# Patient Record
Sex: MALE | Race: WHITE | URBAN - METROPOLITAN AREA
[De-identification: names, ages, dates, MRNs, and addresses within clinical notes are randomized per-mention and may not be internally consistent; named-entity substitution may affect disease eponyms.]

---

## 2022-07-07 ENCOUNTER — APPOINTMENT (RX ONLY)
Dept: URBAN - METROPOLITAN AREA CLINIC 16 | Facility: CLINIC | Age: 82
Setting detail: DERMATOLOGY
End: 2022-07-07

## 2022-07-07 DIAGNOSIS — D485 NEOPLASM OF UNCERTAIN BEHAVIOR OF SKIN: ICD-10-CM

## 2022-07-07 DIAGNOSIS — D18.0 HEMANGIOMA: ICD-10-CM

## 2022-07-07 DIAGNOSIS — D22 MELANOCYTIC NEVI: ICD-10-CM

## 2022-07-07 DIAGNOSIS — Z85.828 PERSONAL HISTORY OF OTHER MALIGNANT NEOPLASM OF SKIN: ICD-10-CM

## 2022-07-07 DIAGNOSIS — L81.4 OTHER MELANIN HYPERPIGMENTATION: ICD-10-CM

## 2022-07-07 DIAGNOSIS — L82.1 OTHER SEBORRHEIC KERATOSIS: ICD-10-CM

## 2022-07-07 PROBLEM — D18.01 HEMANGIOMA OF SKIN AND SUBCUTANEOUS TISSUE: Status: ACTIVE | Noted: 2022-07-07

## 2022-07-07 PROBLEM — D22.5 MELANOCYTIC NEVI OF TRUNK: Status: ACTIVE | Noted: 2022-07-07

## 2022-07-07 PROBLEM — D48.5 NEOPLASM OF UNCERTAIN BEHAVIOR OF SKIN: Status: ACTIVE | Noted: 2022-07-07

## 2022-07-07 PROCEDURE — ? DIAGNOSIS COMMENT

## 2022-07-07 PROCEDURE — ? COUNSELING

## 2022-07-07 PROCEDURE — 99203 OFFICE O/P NEW LOW 30 MIN: CPT | Mod: 25

## 2022-07-07 PROCEDURE — ? SUNSCREEN RECOMMENDATIONS

## 2022-07-07 PROCEDURE — 11102 TANGNTL BX SKIN SINGLE LES: CPT

## 2022-07-07 PROCEDURE — ? BIOPSY BY SHAVE METHOD

## 2022-07-07 ASSESSMENT — LOCATION DETAILED DESCRIPTION DERM
LOCATION DETAILED: LEFT MID-UPPER BACK
LOCATION DETAILED: LEFT RIB CAGE
LOCATION DETAILED: RIGHT INFERIOR LATERAL NECK
LOCATION DETAILED: RIGHT INFERIOR UPPER BACK
LOCATION DETAILED: LEFT ANTERIOR SHOULDER

## 2022-07-07 ASSESSMENT — LOCATION SIMPLE DESCRIPTION DERM
LOCATION SIMPLE: POSTERIOR NECK
LOCATION SIMPLE: RIGHT UPPER BACK
LOCATION SIMPLE: LEFT UPPER BACK
LOCATION SIMPLE: LEFT SHOULDER
LOCATION SIMPLE: ABDOMEN

## 2022-07-07 ASSESSMENT — LOCATION ZONE DERM
LOCATION ZONE: NECK
LOCATION ZONE: ARM
LOCATION ZONE: TRUNK

## 2022-07-07 NOTE — PROCEDURE: MIPS QUALITY
Quality 111:Pneumonia Vaccination Status For Older Adults: Pneumococcal vaccine administered on or after patient’s 60th birthday and before the end of the measurement period
Quality 47: Advance Care Plan: Advance Care Planning discussed and documented; advance care plan or surrogate decision maker documented in the medical record.
Quality 110: Preventive Care And Screening: Influenza Immunization: Influenza Immunization Administered during Influenza season
Quality 431: Preventive Care And Screening: Unhealthy Alcohol Use - Screening: Patient not identified as an unhealthy alcohol user when screened for unhealthy alcohol use using a systematic screening method
Quality 226: Preventive Care And Screening: Tobacco Use: Screening And Cessation Intervention: Patient screened for tobacco use and is an ex/non-smoker
Detail Level: Detailed

## 2022-07-07 NOTE — PROCEDURE: BIOPSY BY SHAVE METHOD
Detail Level: Detailed
Depth Of Biopsy: dermis
Was A Bandage Applied: Yes
Size Of Lesion In Cm: 1
X Size Of Lesion In Cm: 0
Biopsy Type: H and E
Biopsy Method: 15 blade
Anesthesia Type: 1% lidocaine with epinephrine
Anesthesia Volume In Cc (Will Not Render If 0): 0.5
Hemostasis: Drysol
Wound Care: Petrolatum
Dressing: bandage
Destruction After The Procedure: No
Type Of Destruction Used: Curettage
Curettage Text: The wound bed was treated with curettage after the biopsy was performed.
Cryotherapy Text: The wound bed was treated with cryotherapy after the biopsy was performed.
Electrodesiccation Text: The wound bed was treated with electrodesiccation after the biopsy was performed.
Electrodesiccation And Curettage Text: The wound bed was treated with electrodesiccation and curettage after the biopsy was performed.
Silver Nitrate Text: The wound bed was treated with silver nitrate after the biopsy was performed.
Lab: 6
Consent: Written consent was obtained and risks were reviewed including but not limited to scarring, infection, bleeding, scabbing, incomplete removal, nerve damage and allergy to anesthesia.
Post-Care Instructions: I reviewed with the patient in detail post-care instructions. Patient is to keep the biopsy site dry overnight, and then apply bacitracin twice daily until healed. Patient may apply hydrogen peroxide soaks to remove any crusting.
Notification Instructions: Patient will be notified of biopsy results. However, patient instructed to call the office if not contacted within 2 weeks.
Billing Type: Third-Party Bill
Information: Selecting Yes will display possible errors in your note based on the variables you have selected. This validation is only offered as a suggestion for you. PLEASE NOTE THAT THE VALIDATION TEXT WILL BE REMOVED WHEN YOU FINALIZE YOUR NOTE. IF YOU WANT TO FAX A PRELIMINARY NOTE YOU WILL NEED TO TOGGLE THIS TO 'NO' IF YOU DO NOT WANT IT IN YOUR FAXED NOTE.

## 2022-07-09 ENCOUNTER — TELEPHONE ENCOUNTER (OUTPATIENT)
Dept: URBAN - METROPOLITAN AREA CLINIC 121 | Facility: CLINIC | Age: 82
End: 2022-07-09

## 2022-07-10 ENCOUNTER — TELEPHONE ENCOUNTER (OUTPATIENT)
Dept: URBAN - METROPOLITAN AREA CLINIC 121 | Facility: CLINIC | Age: 82
End: 2022-07-10

## 2022-07-10 RX ORDER — METOPROLOL SUCCINATE 50 MG/1
TABLET, EXTENDED RELEASE ORAL
Refills: 0 | Status: ACTIVE | COMMUNITY
Start: 2010-12-03

## 2022-07-10 RX ORDER — NIACIN 1000 MG/1
TABLET, FILM COATED, EXTENDED RELEASE ORAL
Refills: 0 | Status: ACTIVE | COMMUNITY
Start: 2010-12-03

## 2022-07-10 RX ORDER — OMEGA-3S/DHA/EPA/FISH OIL 980-1400MG
CAPSULE,DELAYED RELEASE (ENTERIC COATED) ORAL
Refills: 0 | Status: ACTIVE | COMMUNITY
Start: 2010-12-03

## 2022-07-10 RX ORDER — ASPIRIN 81 MG/1
TABLET, COATED ORAL
Refills: 0 | Status: ACTIVE | COMMUNITY
Start: 2010-12-03

## 2022-08-11 ENCOUNTER — APPOINTMENT (RX ONLY)
Dept: URBAN - METROPOLITAN AREA CLINIC 16 | Facility: CLINIC | Age: 82
Setting detail: DERMATOLOGY
End: 2022-08-11

## 2022-08-11 PROBLEM — C44.41 BASAL CELL CARCINOMA OF SKIN OF SCALP AND NECK: Status: ACTIVE | Noted: 2022-08-11

## 2022-08-11 PROCEDURE — 12042 INTMD RPR N-HF/GENIT2.6-7.5: CPT

## 2022-08-11 PROCEDURE — ? COUNSELING

## 2022-08-11 PROCEDURE — 11623 EXC S/N/H/F/G MAL+MRG 2.1-3: CPT

## 2022-08-11 PROCEDURE — ? EXCISION

## 2023-06-21 ENCOUNTER — APPOINTMENT (OUTPATIENT)
Dept: URBAN - METROPOLITAN AREA CLINIC 195 | Age: 83
Setting detail: DERMATOLOGY
End: 2023-06-23

## 2023-06-21 VITALS
HEART RATE: 68 BPM | HEIGHT: 69 IN | RESPIRATION RATE: 16 BRPM | DIASTOLIC BLOOD PRESSURE: 62 MMHG | WEIGHT: 195 LBS | SYSTOLIC BLOOD PRESSURE: 124 MMHG | TEMPERATURE: 97.4 F

## 2023-06-21 DIAGNOSIS — L57.8 OTHER SKIN CHANGES DUE TO CHRONIC EXPOSURE TO NONIONIZING RADIATION: ICD-10-CM

## 2023-06-21 DIAGNOSIS — Z12.83 ENCOUNTER FOR SCREENING FOR MALIGNANT NEOPLASM OF SKIN: ICD-10-CM

## 2023-06-21 DIAGNOSIS — D18.0 HEMANGIOMA: ICD-10-CM

## 2023-06-21 DIAGNOSIS — L82.1 OTHER SEBORRHEIC KERATOSIS: ICD-10-CM

## 2023-06-21 PROBLEM — D18.01 HEMANGIOMA OF SKIN AND SUBCUTANEOUS TISSUE: Status: ACTIVE | Noted: 2023-06-21

## 2023-06-21 PROBLEM — C43.31 MALIGNANT MELANOMA OF NOSE: Status: ACTIVE | Noted: 2023-06-21

## 2023-06-21 PROBLEM — D03.61 MELANOMA IN SITU OF RIGHT UPPER LIMB, INCLUDING SHOULDER: Status: ACTIVE | Noted: 2023-06-21

## 2023-06-21 PROCEDURE — OTHER MOHS SURGERY: OTHER

## 2023-06-21 PROCEDURE — 17311 MOHS 1 STAGE H/N/HF/G: CPT

## 2023-06-21 PROCEDURE — OTHER ADDITIONAL PATHOLOGY SPECIMEN: OTHER

## 2023-06-21 PROCEDURE — 15260 FTH/GFT FR N/E/E/L 20 SQCM/<: CPT

## 2023-06-21 PROCEDURE — OTHER MIPS QUALITY: OTHER

## 2023-06-21 PROCEDURE — OTHER MELANOMA STAGING: OTHER

## 2023-06-21 PROCEDURE — OTHER SEPARATE AND IDENTIFIABLE DOCUMENTATION: OTHER

## 2023-06-21 PROCEDURE — OTHER COUNSELING: OTHER

## 2023-06-21 PROCEDURE — 99204 OFFICE O/P NEW MOD 45 MIN: CPT | Mod: 25

## 2023-06-21 PROCEDURE — OTHER DEFER: OTHER

## 2023-06-21 PROCEDURE — OTHER SURGICAL DECISION MAKING: OTHER

## 2023-06-21 PROCEDURE — OTHER ADDITIONAL NOTES: OTHER

## 2023-06-21 PROCEDURE — 88342 IMHCHEM/IMCYTCHM 1ST ANTB: CPT | Mod: 59

## 2023-06-21 ASSESSMENT — LOCATION ZONE DERM
LOCATION ZONE: ARM
LOCATION ZONE: TRUNK

## 2023-06-21 ASSESSMENT — LOCATION DETAILED DESCRIPTION DERM
LOCATION DETAILED: LEFT ANTERIOR PROXIMAL UPPER ARM
LOCATION DETAILED: XIPHOID
LOCATION DETAILED: LEFT MEDIAL UPPER BACK
LOCATION DETAILED: RIGHT ANTERIOR PROXIMAL UPPER ARM
LOCATION DETAILED: RIGHT MID-UPPER BACK
LOCATION DETAILED: LEFT MEDIAL SUPERIOR CHEST

## 2023-06-21 ASSESSMENT — LOCATION SIMPLE DESCRIPTION DERM
LOCATION SIMPLE: LEFT UPPER BACK
LOCATION SIMPLE: LEFT UPPER ARM
LOCATION SIMPLE: RIGHT UPPER ARM
LOCATION SIMPLE: RIGHT UPPER BACK
LOCATION SIMPLE: ABDOMEN
LOCATION SIMPLE: CHEST

## 2023-06-21 NOTE — PROCEDURE: ADDITIONAL NOTES
Render Risk Assessment In Note?: no
Patient Management Risk Assessment: Moderate
Additional Notes: Melanoma diagnosis and management has has inherent risk for additional primary melanoma, recurrence and/or metastasis.
Detail Level: Simple

## 2023-06-21 NOTE — PROCEDURE: MOHS SURGERY
Detail Level: Zone Initiate Treatment: Recommended to alternate Tylenol and ibuprofen every 4 hours. Not to exceed the maximum dose of her bottle. \\nContinue to apply an ice pack wrapped in a clean cloth to the area for up to 10min once every hour. 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

## 2023-06-21 NOTE — PROCEDURE: MOHS SURGERY
Need for prophylactic measure Hatchet Flap Text: Because of the full-thickness nature of the defect and to avoid deformity of free margin of the ala, and after full discussion of the spectrum of repair options, a dorsal nasal rotation flap was planned.  After prep and anesthesia, a Burow????????????????????????????????????s triangle was excised from the lateral portion superiorly on the nasal sidewall towards the inner canthus and an incision extended from the inferior margin of the wound across the nose and sidewall, then extended superiorly along the nasofacial sulcus and medial cheek through the inner canthus to the glabella where a back cut was made to the contralateral inner canthus.  The flap was elevated by skeletonizing the nasal root, dorsum, and sidewalls.  After hemostasis obtained, the flap was rotated/carried over into place, trimmed to fit the defect, and sutured in a layered fashion.

## 2023-06-21 NOTE — PROCEDURE: MIPS QUALITY
Quality 397: Melanoma: Reporting: Pathology report includes the pT Category, thickness, ulceration and mitotic rate, peripheral and deep margin status and presence or absence of microsatellitosis for invasive tumors.
Quality 138: Melanoma: Coordination Of Care: A treatment plan was communicated to the physicians providing continuing care within one month of diagnosis outlining: diagnosis, tumor thickness and a plan for surgery or alternate care.
Quality 137: Melanoma: Continuity Of Care - Recall System: Patient information entered into a recall system that includes: target date for the next exam specified AND a process to follow up with patients regarding missed or unscheduled appointments
Quality 130: Documentation Of Current Medications In The Medical Record: Current Medications Documented
Detail Level: Detailed
Quality 226: Preventive Care And Screening: Tobacco Use: Screening And Cessation Intervention: Patient screened for tobacco use and is an ex/non-smoker
Quality 47: Advance Care Plan: Advance Care Planning discussed and documented; advance care plan or surrogate decision maker documented in the medical record.

## 2023-06-21 NOTE — PROCEDURE: DEFER
Size Of Lesion In Cm (Optional): 0
Introduction Text (Please End With A Colon): The following procedure was deferred:
Detail Level: Detailed
Scheduling Instructions (Optional): Mohs surgery on 6/28/23

## 2023-06-21 NOTE — PROCEDURE: COUNSELING
Sunscreen Recommendations: Prescribed broad spectrum sunscreen spf 30+
Detail Level: Detailed
When Should The Patient Follow-Up For Their Next Full-Body Skin Exam?: 6 Months
Quality 138: Melanoma: Coordination Of Care: A treatment plan was communicated to the physicians providing continuing care within one month of diagnosis outlining: diagnosis, tumor thickness and a plan for surgery or alternate care.
Quality 137: Melanoma: Continuity Of Care - Recall System: Patient information entered into a recall system that includes: target date for the next exam specified AND a process to follow up with patients regarding missed or unscheduled appointments
Quality 397: Melanoma: Reporting: Pathology report includes the pT Category, thickness, ulceration and mitotic rate, peripheral and deep margin status and presence or absence of microsatellitosis for invasive tumors.
Show Follow-Up Variable: Yes
Detail Level: Generalized
Detail Level: Zone

## 2023-06-21 NOTE — PROCEDURE: MOHS SURGERY
Patient Education     Well-Child Checkup: 6 to 10 Years     Struggles in school can indicate problems with a child’s health or development. If your child is having trouble in school, talk to the child’s healthcare provider.     Even if your child is healthy, keep bringing him or her in for yearly checkups. These visits make sure that your child’s health is protected with scheduled vaccines and health screenings. Your child's healthcare provider will also check his or her growth and development. This sheet describes some of what you can expect.  School and social issues  Here are some topics you, your child, and the healthcare provider may want to discuss during this visit:  · Reading. Does your child like to read? Is the child reading at the right level for his or her age group?   · Friendships. Does your child have friends at school? How do they get along? Do you like your child’s friends? Do you have any concerns about your child’s friendships or problems that may be happening with other children (such as bullying)?  · Activities. What does your child like to do for fun? Is he or she involved in after-school activities such as sports, scouting, or music classes?   · Family interaction. How are things at home? Does your child have good relationships with others in the family? Does he or she talk to you about problems? How is the child’s behavior at home?   · Behavior and participation at school. How does your child act at school? Does the child follow the classroom routine and take part in group activities? What do teachers say about the child’s behavior? Is homework finished on time? Do you or other family members help with homework?  · Household chores. Does your child help around the house with chores such as taking out the trash or setting the table?  Nutrition and exercise tips  Teaching your child healthy eating and lifestyle habits can lead to a lifetime of good health. To help, set a good example with your  words and actions. Remember, good habits formed now will stay with your child forever. Here are some tips:  · Help your child get at least 30 to 60 minutes of active play per day. Moving around helps keep your child healthy. Go to the park, ride bikes, or play active games like tag or ball.  · Limit “screen time” to 1 hour each day. This includes time spent watching TV, playing video games, using the computer, and texting. If your child has a TV, computer, or video game console in the bedroom, replace it with a music player. For many kids, dancing and singing are fun ways to get moving.  · Limit sugary drinks. Soda, juice, and sports drinks lead to unhealthy weight gain and tooth decay. Water and low-fat or nonfat milk are best to drink. In moderation (6 ounces for a child 6 years old and 12 ounces for a child 7 to 10 years old daily), 100% fruit juice is OK. Save soda and other sugary drinks for special occasions.   · Serve nutritious foods. Keep a variety of healthy foods on hand for snacks, including fresh fruits and vegetables, lean meats, and whole grains. Foods like french fries, candy, and snack foods should only be served rarely.   · Serve child-sized portions. Children don’t need as much food as adults. Serve your child portions that make sense for his or her age and size. Let your child stop eating when he or she is full. If your child is still hungry after a meal, offer more vegetables or fruit.  · Ask the healthcare provider about your child’s weight. Your child should gain about 4 to 5 pounds each year. If your child is gaining more than that, talk to the healthcare provider about healthy eating habits and exercise guidelines.  · Bring your child to the dentist at least twice a year for teeth cleaning and a checkup.  Sleeping tips  Now that your child is in school, a good night’s sleep is even more important. At this age, your child needs about 10 hours of sleep each night. Here are some tips:  · Set a  bedtime and make sure your child follows it each night.  · TV, computer, and video games can agitate a child and make it hard to calm down for the night. Turn them off at least an hour before bed. Instead, read a chapter of a book together.  · Remind your child to brush and floss his or her teeth before bed. Directly supervise your child's dental self-care to make sure that both the back teeth and the front teeth are cleaned.  Safety tips  Recommendations to keep your child safe include the following:   · When riding a bike, your child should wear a helmet with the strap fastened. While roller-skating, roller-blading, or using a scooter or skateboard, it’s safest to wear wrist guards, elbow pads, and knee pads, as well as a helmet.  · In the car, continue to use a booster seat until your child is taller than 4 feet 9 inches. At this height, kids are able to sit with the seat belt fitting correctly over the collarbone and hips. Ask the healthcare provider if you have questions about when your child will be ready to stop using a booster seat. All children younger than 13 should sit in the back seat.  · Teach your child not to talk to strangers or go anywhere with a stranger.  · Teach your child to swim. Many communities offer low-cost swimming lessons. Do not let your child play in or around a pool unattended, even if he or she knows how to swim.  Vaccines  Based on recommendations from the CDC, at this visit your child may receive the following vaccines:  · Diphtheria, tetanus, and pertussis (age 6 only)  · Human papillomavirus (HPV) (ages 9 and up)  · Influenza (flu), annually  · Measles, mumps, and rubella (age 6)  · Polio (age 6)  · Varicella (chickenpox) (age 6)  Bedwetting: It’s not your child’s fault  Bedwetting, or urinating when sleeping, can be frustrating for both you and your child. But it’s usually not a sign of a major problem. Your child’s body may simply need more time to mature. If a child suddenly  starts wetting the bed, the cause is often a lifestyle change (such as starting school) or a stressful event (such as the birth of a sibling). But whatever the cause, it’s not in your child’s direct control. If your child wets the bed:  · Keep in mind that your child is not wetting on purpose. Never punish or tease a child for wetting the bed. Punishment or shaming may make the problem worse, not better.  · To help your child, be positive and supportive. Praise your child for not wetting and even for trying hard to stay dry.  · Two hours before bedtime, don’t serve your child anything to drink.  · Remind your child to use the toilet before bed. You could also wake him or her to use the bathroom before you go to bed yourself.  · Have a routine for changing sheets and pajamas when the child wets. Try to make this routine as calm and orderly as possible. This will help keep both you and your child from getting too upset or frustrated to go back to sleep.  · Put up a calendar or chart and give your child a star or sticker for nights that he or she doesn’t wet the bed.  · Encourage your child to get out of bed and try to use the toilet if he or she wakes during the night. Put night-lights in the bedroom, hallway, and bathroom to help your child feel safer walking to the bathroom.  · If you have concerns about bedwetting, discuss them with the healthcare provider.       Next checkup at: _______________________________     PARENT NOTES:  Date Last Reviewed: 12/1/2016  © 9345-7375 [employer]. [address] [place] [address], [place] [address]. All rights reserved. This information is not intended as a substitute for professional medical care. Always follow your healthcare professional's instructions.            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

## 2023-06-21 NOTE — PROCEDURE: SURGICAL DECISION MAKING
Identified Risk Factors Documented?: yes
Discussion: Decision for surgery refers to any surgeries performed today, or discussion of treatment in the future. In general, decision for repair is not made until the final extent of the surgical wound is known.
Complexity (Necessary For Coding; Major - 90 Day Global With Some Exceptions; Minor - 10 Day Global): minor
Date Of Surgery - Today Or Tomorrow?: today
Risk Assessment Explanation (Does Not Render In The Note): Clinical determination of the probability and/or consequences of an event, such as surgery. Clinical assessment of the level of risk is affected by the nature of the event under consideration for the patient. Modifier 57 is used to indicate an Evaluation and Management (E/M) service resulted in the initial decision to perform surgery either the day before a major surgery (90 day global) or the day of a major surgery.
Hypokalemia

## 2023-06-21 NOTE — PROCEDURE: MELANOMA STAGING
Detail Level: Detailed
Counseling For Stage Ib Melanomas: I reviewed the factors which determine melanoma stage. For Stage IB the 5-year survival rate is around 92%. The 10-year survival is around 86%. I recommended at least monthly skin self-examinations and close dermatology follow-up.
Counseling For Stage Iia Melanomas: I reviewed the factors which determine melanoma stage. For Stage IIA the 5-year survival rate is around 81%. The 10-year survival is around 67%. I recommended at least monthly skin self-examinations and close dermatology follow-up.
Counseling For Stage Iib Melanomas: I reviewed the factors which determine melanoma stage. For Stage IIB the 5-year survival rate is around 70%. The 10-year survival is around 57%. I recommended at least monthly skin self-examinations and close dermatology follow-up.
Counseling For Stage 0 Melanomas: I reviewed the factors which determine melanoma stage. For Stage 0 the 5-year survival rate is 100%. I recommended at least monthly skin self-examinations and close dermatology follow-up.
Distant Metastases: No distant metastases
Who Was Counseled?: patient and their spouse
Counseling For Stage Ia Melanomas: I reviewed the factors which determine melanoma stage. For Stage IA the 5-year survival rate is around 97%. The 10-year survival is around 95%. I recommended at least monthly skin self-examinations and close dermatology follow-up.
Counseling For Stage Iiia Melanomas: I reviewed the factors which determine melanoma stage. For Stage IIIA the 5-year survival rate is around 78%. The 10-year survival is around 68%. I recommended at least monthly skin self-examinations and close dermatology follow-up.
Counseling For Stage Iiib Melanomas: I reviewed the factors which determine melanoma stage. For Stage IIIB the 5-year survival rate is around 59%. The 10-year survival is around 43%. I recommended at least monthly skin self-examinations and close dermatology follow-up.
Counseling For Stage Iiic Melanomas: I reviewed the factors which determine melanoma stage. For Stage IIIC the 5-year survival rate is around 40%. The 10-year survival is around 24%. I recommended at least monthly skin self-examinations and close dermatology follow-up.
Counseling For Stage Iiid Melanomas: I reviewed the factors which determine melanoma stage. For Stage IIID the 5-year survival rate is around 32%. The 10-year survival is around 24%. I recommended at least monthly skin self-examinations and close dermatology follow-up.
Counseling For Stage Iv Melanomas: I reviewed the factors which determine melanoma stage. For Stage IV the 5-year survival rate is around 15% to 20%. The 10-year survival is about 10% to 15%. I recommended at least monthly skin self-examinations and close dermatology follow-up.
Staging Type: initial staging
Primary Tumor Identified?: Yes
Counseling For Stage Iic Melanomas: I reviewed the factors which determine melanoma stage. For Stage IIC the 5-year survival rate is around 53%. The 10-year survival is around 40%. I recommended at least monthly skin self-examinations and close dermatology follow-up.
Breslow Depth In Mm (Leave At 0 For In Situ): 0.3
Ulceration: No
Lymph Node Evaluation (Clinical And Microscopic): No clinically palpable lymph nodes and no microscopic lymph node metastases

## 2023-06-28 ENCOUNTER — APPOINTMENT (OUTPATIENT)
Dept: URBAN - METROPOLITAN AREA CLINIC 195 | Age: 83
Setting detail: DERMATOLOGY
End: 2023-06-30

## 2023-06-28 VITALS
HEIGHT: 69 IN | HEART RATE: 68 BPM | RESPIRATION RATE: 16 BRPM | DIASTOLIC BLOOD PRESSURE: 64 MMHG | WEIGHT: 195 LBS | SYSTOLIC BLOOD PRESSURE: 124 MMHG | TEMPERATURE: 97.8 F

## 2023-06-28 DIAGNOSIS — Z85.820 PERSONAL HISTORY OF MALIGNANT MELANOMA OF SKIN: ICD-10-CM

## 2023-06-28 PROBLEM — Z48.01 ENCOUNTER FOR CHANGE OR REMOVAL OF SURGICAL WOUND DRESSING: Status: ACTIVE | Noted: 2023-06-28

## 2023-06-28 PROBLEM — D03.61 MELANOMA IN SITU OF RIGHT UPPER LIMB, INCLUDING SHOULDER: Status: ACTIVE | Noted: 2023-06-28

## 2023-06-28 PROCEDURE — OTHER SEPARATE AND IDENTIFIABLE DOCUMENTATION: OTHER

## 2023-06-28 PROCEDURE — OTHER MIPS QUALITY: OTHER

## 2023-06-28 PROCEDURE — OTHER MOHS SURGERY: OTHER

## 2023-06-28 PROCEDURE — OTHER ADDITIONAL NOTES: OTHER

## 2023-06-28 PROCEDURE — 13121 CMPLX RPR S/A/L 2.6-7.5 CM: CPT

## 2023-06-28 PROCEDURE — OTHER DRESSING CHANGE (GLOBAL PERIOD): OTHER

## 2023-06-28 PROCEDURE — OTHER SURGICAL DECISION MAKING: OTHER

## 2023-06-28 PROCEDURE — 88342 IMHCHEM/IMCYTCHM 1ST ANTB: CPT | Mod: 59

## 2023-06-28 PROCEDURE — 99024 POSTOP FOLLOW-UP VISIT: CPT

## 2023-06-28 PROCEDURE — 17313 MOHS 1 STAGE T/A/L: CPT | Mod: 79

## 2023-06-28 PROCEDURE — OTHER MELANOMA STAGING: OTHER

## 2023-06-28 PROCEDURE — OTHER COUNSELING: OTHER

## 2023-06-28 NOTE — PROCEDURE: DRESSING CHANGE (GLOBAL PERIOD)
Add 76615 Cpt? (Important Note: In 2017 The Use Of 53861 Is Being Tracked By Cms To Determine Future Global Period Reimbursement For Global Periods): yes
Detail Level: Detailed

## 2023-06-28 NOTE — PROCEDURE: MOHS SURGERY
Mustarde Flap Text: Because of full-thickness of the defect and to avoid distortion of the lower eyelid and canthus, a Mustarde cheek rotation flap was planned. After prep and anesthesia, a large Burow's triangle was excised inferiorly.  An incision was made from the superior portion of the wound over the orbital rim, curving upward onto the temple, then down toward the preauricular crease where another Burow????????????????????????????????????????????????????????????????????????????????????????????????????????????????????????????????????????????????????????????????????????????????????????????????????????????????????????????????????????????????????????????????????????????????????????????????????????????????????????????????????????????????????????????????????????????????????????????????????????????????????????????????????????????????s triangle was excised.  The full cheek flap was elevated and the wound edges were undermined in the subcutaneous plane. After hemostasis, the flap was carried over and rotated into place with care to preserve lower lid position, trimmed at the tip, suspended with a periosteal stitch from the orbital rim, and sutured in a a layered fashion.

## 2023-06-28 NOTE — PROCEDURE: MOHS SURGERY
40 No Repair - Repaired With Adjacent Surgical Defect Text (Leave Blank If You Do Not Want): After obtaining clear surgical margins the defect was repaired concurrently with another surgical defect which was in close approximation.

## 2023-06-28 NOTE — PROCEDURE: MELANOMA STAGING
Counseling For Stage Iia Melanomas: I reviewed the factors which determine melanoma stage. For Stage IIA the 5-year survival rate is around 81%. The 10-year survival is around 67%. I recommended at least monthly skin self-examinations and close dermatology follow-up.
Counseling For Stage Iic Melanomas: I reviewed the factors which determine melanoma stage. For Stage IIC the 5-year survival rate is around 53%. The 10-year survival is around 40%. I recommended at least monthly skin self-examinations and close dermatology follow-up.
Counseling For Stage Iiib Melanomas: I reviewed the factors which determine melanoma stage. For Stage IIIB the 5-year survival rate is around 59%. The 10-year survival is around 43%. I recommended at least monthly skin self-examinations and close dermatology follow-up.
Counseling For Stage Iiid Melanomas: I reviewed the factors which determine melanoma stage. For Stage IIID the 5-year survival rate is around 32%. The 10-year survival is around 24%. I recommended at least monthly skin self-examinations and close dermatology follow-up.
Distant Metastases: No distant metastases
Counseling For Stage 0 Melanomas: I reviewed the factors which determine melanoma stage. For Stage 0 the 5-year survival rate is 100%. I recommended at least monthly skin self-examinations and close dermatology follow-up.
Ulceration: No
Counseling For Stage Ib Melanomas: I reviewed the factors which determine melanoma stage. For Stage IB the 5-year survival rate is around 92%. The 10-year survival is around 86%. I recommended at least monthly skin self-examinations and close dermatology follow-up.
Counseling For Stage Iib Melanomas: I reviewed the factors which determine melanoma stage. For Stage IIB the 5-year survival rate is around 70%. The 10-year survival is around 57%. I recommended at least monthly skin self-examinations and close dermatology follow-up.
Mitotic Rate: Less than 1/mm2
Detail Level: Detailed
Who Was Counseled?: patient
Staging Type: initial staging
Breslow Depth In Mm (Leave At 0 For In Situ): 0
Counseling For Stage Ia Melanomas: I reviewed the factors which determine melanoma stage. For Stage IA the 5-year survival rate is around 97%. The 10-year survival is around 95%. I recommended at least monthly skin self-examinations and close dermatology follow-up.
Are Regional Lymph Nodes Available For Evaluation: Yes
Lymph Node Evaluation (Clinical And Microscopic): No clinically palpable lymph nodes and no microscopic lymph node metastases
Counseling For Stage Iiic Melanomas: I reviewed the factors which determine melanoma stage. For Stage IIIC the 5-year survival rate is around 40%. The 10-year survival is around 24%. I recommended at least monthly skin self-examinations and close dermatology follow-up.
Counseling For Stage Iiia Melanomas: I reviewed the factors which determine melanoma stage. For Stage IIIA the 5-year survival rate is around 78%. The 10-year survival is around 68%. I recommended at least monthly skin self-examinations and close dermatology follow-up.
Counseling For Stage Iv Melanomas: I reviewed the factors which determine melanoma stage. For Stage IV the 5-year survival rate is around 15% to 20%. The 10-year survival is about 10% to 15%. I recommended at least monthly skin self-examinations and close dermatology follow-up.

## 2023-06-28 NOTE — PROCEDURE: SURGICAL DECISION MAKING
Discussion: Decision for surgery refers to any surgeries performed today, or discussion of treatment in the future. In general, decision for repair is not made until the final extent of the surgical wound is known.
Complexity (Necessary For Coding; Major - 90 Day Global With Some Exceptions; Minor - 10 Day Global): major
Identified Risk Factors Documented?: yes
Date Of Surgery - Today Or Tomorrow?: today
Risk Assessment Explanation (Does Not Render In The Note): Clinical determination of the probability and/or consequences of an event, such as surgery. Clinical assessment of the level of risk is affected by the nature of the event under consideration for the patient. Modifier 57 is used to indicate an Evaluation and Management (E/M) service resulted in the initial decision to perform surgery either the day before a major surgery (90 day global) or the day of a major surgery.
Initial Decision For Surgery?: No

## 2023-06-28 NOTE — PROCEDURE: ADDITIONAL NOTES
Render Risk Assessment In Note?: no
Detail Level: Simple
Additional Notes: Debulking results discussed with patient.

## 2023-06-28 NOTE — PROCEDURE: MOHS SURGERY
Statement Selected Consent (Ear)/Introductory Paragraph: The rationale for Mohs was explained to the patient and consent was obtained. The risks, benefits and alternatives to therapy were discussed in detail. Specifically, the risks of ear deformity, infection, scarring, bleeding, prolonged wound healing, incomplete removal, allergy to anesthesia, nerve injury and recurrence were addressed. Prior to the procedure, the treatment site was clearly identified and confirmed by the patient. All components of Universal Protocol/PAUSE Rule completed.

## 2023-06-28 NOTE — PROCEDURE: MIPS QUALITY
Quality 47: Advance Care Plan: Advance Care Planning discussed and documented; advance care plan or surrogate decision maker documented in the medical record.
Quality 226: Preventive Care And Screening: Tobacco Use: Screening And Cessation Intervention: Patient screened for tobacco use and is an ex/non-smoker
Quality 397: Melanoma: Reporting: Pathology report includes the pT Category, thickness, ulceration and mitotic rate, peripheral and deep margin status and presence or absence of microsatellitosis for invasive tumors.
Detail Level: Detailed
Quality 130: Documentation Of Current Medications In The Medical Record: Current Medications Documented
Quality 137: Melanoma: Continuity Of Care - Recall System: Patient information entered into a recall system that includes: target date for the next exam specified AND a process to follow up with patients regarding missed or unscheduled appointments
Quality 138: Melanoma: Coordination Of Care: A treatment plan was communicated to the physicians providing continuing care within one month of diagnosis outlining: diagnosis, tumor thickness and a plan for surgery or alternate care.

## 2023-07-07 NOTE — PROCEDURE: MOHS SURGERY
Medical Necessity Information: It is in your best interest to select a reason for this procedure from the list below. All of these items fulfill various CMS LCD requirements except lesion extends to a margin. Include Z78.9 (Other Specified Conditions Influencing Health Status) As An Associated Diagnosis?: No Medical Necessity Clause: This procedure was medically necessary because the lesion that was treated was: Lab: [id number] Lab Facility:  Surgeon (Optional): Favio Harmon M.D. Biopsy Photograph Reviewed: Yes Size Of Lesion In Cm: 1.1 Size Of Margin In Cm: 0.2 Anesthesia Volume In Cc: 2 Eye Clamp Note Details: An eye clamp was used during the procedure. Excision Method: Excisional Biopsy Saucerization Depth: dermis and superficial adipose tissue Repair Type: Intermediate Intermediate / Complex Repair - Final Wound Length In Cm: 2.7 Suturegard Retention Suture: 2-0 Nylon Retention Suture Bite Size: 3 mm Length To Time In Minutes Device Was In Place: 10 Number Of Hemigard Strips Per Side: 1 Undermining Type: Entire Wound Debridement Text: The wound edges were debrided prior to proceeding with the closure to facilitate wound healing. Helical Rim Text: The closure involved the helical rim. Vermilion Border Text: The closure involved the vermilion border. Nostril Rim Text: The closure involved the nostril rim. Retention Suture Text: Retention sutures were placed to support the closure and prevent dehiscence. Primary Defect Length (In Cm): 0 Suture Removal: 7 days Epidermal Closure Graft Donor Site (Optional): simple interrupted Graft Donor Site Bandage (Optional-Leave Blank If You Don't Want In Note): Steri-strips and a pressure bandage were applied to the donor site. Detail Level: Detailed Excision Depth: adipose tissue Scalpel Size: 15 blade Anesthesia Type: 1% lidocaine with 1:100,000 epinephrine and a 1:12 solution of 8.4% sodium bicarbonate Additional Anesthesia Volume In Cc: 6 Hemostasis: Electrodesiccation Estimated Blood Loss (Cc): minimal Undermining Location (Optional): in the deep fat Deep Sutures: 4-0 Maxon Dermal Closure: buried vertical mattress Epidermal Sutures: 6-0 Surgipro Epidermal Closure: running cuticular Wound Care: Petrolatum Dressing: pressure dressing with telfa Suturegard Intro: Intraoperative tissue expansion was performed, utilizing the SUTUREGARD device, in order to reduce wound tension. Suturegard Body: The suture ends were repeatedly re-tightened and re-clamped to achieve the desired tissue expansion. Hemigard Intro: Due to skin fragility and wound tension, it was decided to use HEMIGARD adhesive retention suture devices to permit a linear closure. The skin was cleaned and dried for a 6cm distance away from the wound. Excessive hair, if present, was removed to allow for adhesion. Hemigard Postcare Instructions: The HEMIGARD strips are to remain completely dry for at least 5-7 days. Positioning (Leave Blank If You Do Not Want): The patient was placed in a comfortable position exposing the surgical site. Complex Repair Preamble Text (Leave Blank If You Do Not Want): Extensive wide undermining was performed. Intermediate Repair Preamble Text (Leave Blank If You Do Not Want): Undermining was performed with blunt dissection. Fusiform Excision Additional Text (Leave Blank If You Do Not Want): The margin was drawn around the clinically apparent lesion.  A fusiform shape was then drawn on the skin incorporating the lesion and margins.  Incisions were then made along these lines to the appropriate tissue plane and the lesion was extirpated. Eliptical Excision Additional Text (Leave Blank If You Do Not Want): The margin was drawn around the clinically apparent lesion.  An elliptical shape was then drawn on the skin incorporating the lesion and margins.  Incisions were then made along these lines to the appropriate tissue plane and the lesion was extirpated. Saucerization Excision Additional Text (Leave Blank If You Do Not Want): The margin was drawn around the clinically apparent lesion.  Incisions were then made along these lines, in a tangential fashion, to the appropriate tissue plane and the lesion was extirpated. Slit Excision Additional Text (Leave Blank If You Do Not Want): A linear line was drawn on the skin overlying the lesion. An incision was made slowly until the lesion was visualized.  Once visualized, the lesion was removed with blunt dissection. Excisional Biopsy Additional Text (Leave Blank If You Do Not Want): The margin was drawn around the clinically apparent lesion. An elliptical shape was then drawn on the skin incorporating the lesion and margins.  Incisions were then made along these lines to the appropriate tissue plane and the lesion was extirpated. Perilesional Excision Additional Text (Leave Blank If You Do Not Want): The margin was drawn around the clinically apparent lesion. Incisions were then made along these lines to the appropriate tissue plane and the lesion was extirpated. Repair Performed By Another Provider Text (Leave Blank If You Do Not Want): After the tissue was excised the defect was repaired by another provider. No Repair - Repaired With Adjacent Surgical Defect Text (Leave Blank If You Do Not Want): After the excision the defect was repaired concurrently with another surgical defect which was in close approximation. Adjacent Tissue Transfer Text: The defect edges were debeveled with a #15 scalpel blade.  Given the location of the defect and the proximity to free margins an adjacent tissue transfer was deemed most appropriate.  Using a sterile surgical marker, an appropriate flap was drawn incorporating the defect and placing the expected incisions within the relaxed skin tension lines where possible.    The area thus outlined was incised deep to adipose tissue with a #15 scalpel blade.  The skin margins were undermined to an appropriate distance in all directions utilizing iris scissors. Advancement Flap (Single) Text: The defect edges were debeveled with a #15 scalpel blade.  Given the location of the defect and the proximity to free margins a single advancement flap was deemed most appropriate.  Using a sterile surgical marker, an appropriate advancement flap was drawn incorporating the defect and placing the expected incisions within the relaxed skin tension lines where possible.    The area thus outlined was incised deep to adipose tissue with a #15 scalpel blade.  The skin margins were undermined to an appropriate distance in all directions utilizing iris scissors. Advancement Flap (Double) Text: The defect edges were debeveled with a #15 scalpel blade.  Given the location of the defect and the proximity to free margins a double advancement flap was deemed most appropriate.  Using a sterile surgical marker, the appropriate advancement flaps were drawn incorporating the defect and placing the expected incisions within the relaxed skin tension lines where possible.    The area thus outlined was incised deep to adipose tissue with a #15 scalpel blade.  The skin margins were undermined to an appropriate distance in all directions utilizing iris scissors. Burow's Advancement Flap Text: The defect edges were debeveled with a #15 scalpel blade.  Given the location of the defect and the proximity to free margins a Burow's advancement flap was deemed most appropriate.  Using a sterile surgical marker, the appropriate advancement flap was drawn incorporating the defect and placing the expected incisions within the relaxed skin tension lines where possible.    The area thus outlined was incised deep to adipose tissue with a #15 scalpel blade.  The skin margins were undermined to an appropriate distance in all directions utilizing iris scissors. Chonodrocutaneous Helical Advancement Flap Text: The defect edges were debeveled with a #15 scalpel blade.  Given the location of the defect and the proximity to free margins a chondrocutaneous helical advancement flap was deemed most appropriate.  Using a sterile surgical marker, the appropriate advancement flap was drawn incorporating the defect and placing the expected incisions within the relaxed skin tension lines where possible.    The area thus outlined was incised deep to adipose tissue with a #15 scalpel blade.  The skin margins were undermined to an appropriate distance in all directions utilizing iris scissors. Crescentic Advancement Flap Text: The defect edges were debeveled with a #15 scalpel blade.  Given the location of the defect and the proximity to free margins a crescentic advancement flap was deemed most appropriate.  Using a sterile surgical marker, the appropriate advancement flap was drawn incorporating the defect and placing the expected incisions within the relaxed skin tension lines where possible.    The area thus outlined was incised deep to adipose tissue with a #15 scalpel blade.  The skin margins were undermined to an appropriate distance in all directions utilizing iris scissors. A-T Advancement Flap Text: The defect edges were debeveled with a #15 scalpel blade.  Given the location of the defect, shape of the defect and the proximity to free margins an A-T advancement flap was deemed most appropriate.  Using a sterile surgical marker, an appropriate advancement flap was drawn incorporating the defect and placing the expected incisions within the relaxed skin tension lines where possible.    The area thus outlined was incised deep to adipose tissue with a #15 scalpel blade.  The skin margins were undermined to an appropriate distance in all directions utilizing iris scissors. O-T Advancement Flap Text: The defect edges were debeveled with a #15 scalpel blade.  Given the location of the defect, shape of the defect and the proximity to free margins an O-T advancement flap was deemed most appropriate.  Using a sterile surgical marker, an appropriate advancement flap was drawn incorporating the defect and placing the expected incisions within the relaxed skin tension lines where possible.    The area thus outlined was incised deep to adipose tissue with a #15 scalpel blade.  The skin margins were undermined to an appropriate distance in all directions utilizing iris scissors. O-L Flap Text: The defect edges were debeveled with a #15 scalpel blade.  Given the location of the defect, shape of the defect and the proximity to free margins an O-L flap was deemed most appropriate.  Using a sterile surgical marker, an appropriate advancement flap was drawn incorporating the defect and placing the expected incisions within the relaxed skin tension lines where possible.    The area thus outlined was incised deep to adipose tissue with a #15 scalpel blade.  The skin margins were undermined to an appropriate distance in all directions utilizing iris scissors. O-Z Flap Text: The defect edges were debeveled with a #15 scalpel blade.  Given the location of the defect, shape of the defect and the proximity to free margins an O-Z flap was deemed most appropriate.  Using a sterile surgical marker, an appropriate transposition flap was drawn incorporating the defect and placing the expected incisions within the relaxed skin tension lines where possible. The area thus outlined was incised deep to adipose tissue with a #15 scalpel blade.  The skin margins were undermined to an appropriate distance in all directions utilizing iris scissors. V-Y Flap Text: Because of the full-thickness nature of the wound and to preserve nearby structures, a V-toY Advancement flap was planned. After prep and local anesthesia, a Burow's triangle was excised adjacent to the defect.  Opposite from this, two smaller Burow's triangles were excised.  Three flaps were created by undermining in the deep subcutaneous plane. After hemostasis, the flaps were advanced, carried over  and closed in a layered fashion. Double O-Z Flap Text: The defect edges were debeveled with a #15 scalpel blade.  Given the location of the defect, shape of the defect and the proximity to free margins a Double O-Z flap was deemed most appropriate.  Using a sterile surgical marker, an appropriate transposition flap was drawn incorporating the defect and placing the expected incisions within the relaxed skin tension lines where possible. The area thus outlined was incised deep to adipose tissue with a #15 scalpel blade.  The skin margins were undermined to an appropriate distance in all directions utilizing iris scissors. V-Y Flap Text: The defect edges were debeveled with a #15 scalpel blade.  Given the location of the defect, shape of the defect and the proximity to free margins a V-Y flap was deemed most appropriate.  Using a sterile surgical marker, an appropriate advancement flap was drawn incorporating the defect and placing the expected incisions within the relaxed skin tension lines where possible.    The area thus outlined was incised deep to adipose tissue with a #15 scalpel blade.  The skin margins were undermined to an appropriate distance in all directions utilizing iris scissors. Advancement-Rotation Flap Text: The defect edges were debeveled with a #15 scalpel blade.  Given the location of the defect, shape of the defect and the proximity to free margins an advancement-rotation flap was deemed most appropriate.  Using a sterile surgical marker, an appropriate flap was drawn incorporating the defect and placing the expected incisions within the relaxed skin tension lines where possible. The area thus outlined was incised deep to adipose tissue with a #15 scalpel blade.  The skin margins were undermined to an appropriate distance in all directions utilizing iris scissors. Mercedes Flap Text: The defect edges were debeveled with a #15 scalpel blade.  Given the location of the defect, shape of the defect and the proximity to free margins a Mercedes flap was deemed most appropriate.  Using a sterile surgical marker, an appropriate advancement flap was drawn incorporating the defect and placing the expected incisions within the relaxed skin tension lines where possible. The area thus outlined was incised deep to adipose tissue with a #15 scalpel blade.  The skin margins were undermined to an appropriate distance in all directions utilizing iris scissors. Modified Advancement Flap Text: The defect edges were debeveled with a #15 scalpel blade.  Given the location of the defect, shape of the defect and the proximity to free margins a modified advancement flap was deemed most appropriate.  Using a sterile surgical marker, an appropriate advancement flap was drawn incorporating the defect and placing the expected incisions within the relaxed skin tension lines where possible.    The area thus outlined was incised deep to adipose tissue with a #15 scalpel blade.  The skin margins were undermined to an appropriate distance in all directions utilizing iris scissors. Mucosal Advancement Flap Text: Given the location of the defect, shape of the defect and the proximity to free margins a mucosal advancement flap was deemed most appropriate. Incisions were made with a 15 blade scalpel in the appropriate fashion along the cutaneous vermillion border and the mucosal lip. The remaining actinically damaged mucosal tissue was excised.  The mucosal advancement flap was then elevated to the gingival sulcus with care taken to preserve the neurovascular structures and advanced into the primary defect. Care was taken to ensure that precise realignment of the vermilion border was achieved. Peng Advancement Flap Text: The defect edges were debeveled with a #15 scalpel blade.  Given the location of the defect, shape of the defect and the proximity to free margins a Peng advancement flap was deemed most appropriate.  Using a sterile surgical marker, an appropriate advancement flap was drawn incorporating the defect and placing the expected incisions within the relaxed skin tension lines where possible. The area thus outlined was incised deep to adipose tissue with a #15 scalpel blade.  The skin margins were undermined to an appropriate distance in all directions utilizing iris scissors. Hatchet Flap Text: The defect edges were debeveled with a #15 scalpel blade.  Given the location of the defect, shape of the defect and the proximity to free margins a hatchet flap was deemed most appropriate.  Using a sterile surgical marker, an appropriate hatchet flap was drawn incorporating the defect and placing the expected incisions within the relaxed skin tension lines where possible.    The area thus outlined was incised deep to adipose tissue with a #15 scalpel blade.  The skin margins were undermined to an appropriate distance in all directions utilizing iris scissors. Rotation Flap Text: The defect edges were debeveled with a #15 scalpel blade.  Given the location of the defect, shape of the defect and the proximity to free margins a rotation flap was deemed most appropriate.  Using a sterile surgical marker, an appropriate rotation flap was drawn incorporating the defect and placing the expected incisions within the relaxed skin tension lines where possible.    The area thus outlined was incised deep to adipose tissue with a #15 scalpel blade.  The skin margins were undermined to an appropriate distance in all directions utilizing iris scissors. Bilateral Rotation Flap Text: The defect edges were debeveled with a #15 scalpel blade. Given the location of the defect, shape of the defect and the proximity to free margins a bilateral rotation flap was deemed most appropriate. Using a sterile surgical marker, an appropriate rotation flap was drawn incorporating the defect and placing the expected incisions within the relaxed skin tension lines where possible. The area thus outlined was incised deep to adipose tissue with a #15 scalpel blade. The skin margins were undermined to an appropriate distance in all directions utilizing iris scissors. Following this, the designed flap was carried over into the primary defect and sutured into place. Spiral Flap Text: The defect edges were debeveled with a #15 scalpel blade.  Given the location of the defect, shape of the defect and the proximity to free margins a spiral flap was deemed most appropriate.  Using a sterile surgical marker, an appropriate rotation flap was drawn incorporating the defect and placing the expected incisions within the relaxed skin tension lines where possible. The area thus outlined was incised deep to adipose tissue with a #15 scalpel blade.  The skin margins were undermined to an appropriate distance in all directions utilizing iris scissors. Staged Advancement Flap Text: The defect edges were debeveled with a #15 scalpel blade.  Given the location of the defect, shape of the defect and the proximity to free margins a staged advancement flap was deemed most appropriate.  Using a sterile surgical marker, an appropriate advancement flap was drawn incorporating the defect and placing the expected incisions within the relaxed skin tension lines where possible. The area thus outlined was incised deep to adipose tissue with a #15 scalpel blade.  The skin margins were undermined to an appropriate distance in all directions utilizing iris scissors. Star Wedge Flap Text: The defect edges were debeveled with a #15 scalpel blade.  Given the location of the defect, shape of the defect and the proximity to free margins a star wedge flap was deemed most appropriate.  Using a sterile surgical marker, an appropriate rotation flap was drawn incorporating the defect and placing the expected incisions within the relaxed skin tension lines where possible. The area thus outlined was incised deep to adipose tissue with a #15 scalpel blade.  The skin margins were undermined to an appropriate distance in all directions utilizing iris scissors. Transposition Flap Text: The defect edges were debeveled with a #15 scalpel blade.  Given the location of the defect and the proximity to free margins a transposition flap was deemed most appropriate.  Using a sterile surgical marker, an appropriate transposition flap was drawn incorporating the defect.    The area thus outlined was incised deep to adipose tissue with a #15 scalpel blade.  The skin margins were undermined to an appropriate distance in all directions utilizing iris scissors. Muscle Hinge Flap Text: The defect edges were debeveled with a #15 scalpel blade.  Given the size, depth and location of the defect and the proximity to free margins a muscle hinge flap was deemed most appropriate.  Using a sterile surgical marker, an appropriate hinge flap was drawn incorporating the defect. The area thus outlined was incised with a #15 scalpel blade.  The skin margins were undermined to an appropriate distance in all directions utilizing iris scissors. Mustarde Flap Text: The defect edges were debeveled with a #15 scalpel blade.  Given the size, depth and location of the defect and the proximity to free margins a Mustarde flap was deemed most appropriate.  Using a sterile surgical marker, an appropriate flap was drawn incorporating the defect. The area thus outlined was incised with a #15 scalpel blade.  The skin margins were undermined to an appropriate distance in all directions utilizing iris scissors. Nasal Turnover Hinge Flap Text: The defect edges were debeveled with a #15 scalpel blade.  Given the size, depth, location of the defect and the defect being full thickness a nasal turnover hinge flap was deemed most appropriate.  Using a sterile surgical marker, an appropriate hinge flap was drawn incorporating the defect. The area thus outlined was incised with a #15 scalpel blade. The flap was designed to recreate the nasal mucosal lining and the alar rim. The skin margins were undermined to an appropriate distance in all directions utilizing iris scissors. Nasalis-Muscle-Based Myocutaneous Island Pedicle Flap Text: Using a #15 blade, an incision was made around the donor flap to the level of the nasalis muscle. Wide lateral undermining was then performed in both the subcutaneous plane above the nasalis muscle, and in a submuscular plane just above periosteum. This allowed the formation of a free nasalis muscle axial pedicle (based on the angular artery) which was still attached to the actual cutaneous flap, increasing its mobility and vascular viability. Hemostasis was obtained with pinpoint electrocoagulation. The flap was mobilized into position and the pivotal anchor points positioned and stabilized with buried interrupted sutures. Subcutaneous and dermal tissues were closed in a multilayered fashion with sutures. Tissue redundancies were excised, and the epidermal edges were apposed without significant tension and sutured with sutures. Orbicularis Oris Muscle Flap Text: The defect edges were debeveled with a #15 scalpel blade.  Given that the defect affected the competency of the oral sphincter an obicularis oris muscle flap was deemed most appropriate to restore this competency and normal muscle function.  Using a sterile surgical marker, an appropriate flap was drawn incorporating the defect. The area thus outlined was incised with a #15 scalpel blade. Melolabial Transposition Flap Text: The defect edges were debeveled with a #15 scalpel blade.  Given the location of the defect and the proximity to free margins a melolabial flap was deemed most appropriate.  Using a sterile surgical marker, an appropriate melolabial transposition flap was drawn incorporating the defect.    The area thus outlined was incised deep to adipose tissue with a #15 scalpel blade.  The skin margins were undermined to an appropriate distance in all directions utilizing iris scissors. Rhombic Flap Text: The defect edges were debeveled with a #15 scalpel blade.  Given the location of the defect and the proximity to free margins a rhombic flap was deemed most appropriate.  Using a sterile surgical marker, an appropriate rhombic flap was drawn incorporating the defect.    The area thus outlined was incised deep to adipose tissue with a #15 scalpel blade.  The skin margins were undermined to an appropriate distance in all directions utilizing iris scissors. Rhomboid Transposition Flap Text: The defect edges were debeveled with a #15 scalpel blade.  Given the location of the defect and the proximity to free margins a rhomboid transposition flap was deemed most appropriate.  Using a sterile surgical marker, an appropriate rhomboid flap was drawn incorporating the defect.    The area thus outlined was incised deep to adipose tissue with a #15 scalpel blade.  The skin margins were undermined to an appropriate distance in all directions utilizing iris scissors. Bi-Rhombic Flap Text: The defect edges were debeveled with a #15 scalpel blade.  Given the location of the defect and the proximity to free margins a bi-rhombic flap was deemed most appropriate.  Using a sterile surgical marker, an appropriate rhombic flap was drawn incorporating the defect. The area thus outlined was incised deep to adipose tissue with a #15 scalpel blade.  The skin margins were undermined to an appropriate distance in all directions utilizing iris scissors. Helical Rim Advancement Flap Text: The defect edges were debeveled with a #15 blade scalpel.  Given the location of the defect and the proximity to free margins (helical rim) a double helical rim advancement flap was deemed most appropriate.  Using a sterile surgical marker, the appropriate advancement flaps were drawn incorporating the defect and placing the expected incisions between the helical rim and antihelix where possible.  The area thus outlined was incised through and through with a #15 scalpel blade.  With a skin hook and iris scissors, the flaps were gently and sharply undermined and freed up. Bilateral Helical Rim Advancement Flap Text: The defect edges were debeveled with a #15 blade scalpel.  Given the location of the defect and the proximity to free margins (helical rim) a bilateral helical rim advancement flap was deemed most appropriate.  Using a sterile surgical marker, the appropriate advancement flaps were drawn incorporating the defect and placing the expected incisions between the helical rim and antihelix where possible.  The area thus outlined was incised through and through with a #15 scalpel blade.  With a skin hook and iris scissors, the flaps were gently and sharply undermined and freed up. Ear Star Wedge Flap Text: The defect edges were debeveled with a #15 blade scalpel.  Given the location of the defect and the proximity to free margins (helical rim) an ear star wedge flap was deemed most appropriate.  Using a sterile surgical marker, the appropriate flap was drawn incorporating the defect and placing the expected incisions between the helical rim and antihelix where possible.  The area thus outlined was incised through and through with a #15 scalpel blade. Banner Transposition Flap Text: The defect edges were debeveled with a #15 scalpel blade.  Given the location of the defect and the proximity to free margins a Banner transposition flap was deemed most appropriate.  Using a sterile surgical marker, an appropriate flap drawn around the defect. The area thus outlined was incised deep to adipose tissue with a #15 scalpel blade.  The skin margins were undermined to an appropriate distance in all directions utilizing iris scissors. Bilobed Flap Text: The defect edges were debeveled with a #15 scalpel blade.  Given the location of the defect and the proximity to free margins a bilobe flap was deemed most appropriate.  Using a sterile surgical marker, an appropriate bilobe flap drawn around the defect.    The area thus outlined was incised deep to adipose tissue with a #15 scalpel blade.  The skin margins were undermined to an appropriate distance in all directions utilizing iris scissors. Bilobed Transposition Flap Text: The defect edges were debeveled with a #15 scalpel blade.  Given the location of the defect and the proximity to free margins a bilobed transposition flap was deemed most appropriate.  Using a sterile surgical marker, an appropriate bilobe flap drawn around the defect.    The area thus outlined was incised deep to adipose tissue with a #15 scalpel blade.  The skin margins were undermined to an appropriate distance in all directions utilizing iris scissors. Trilobed Flap Text: The defect edges were debeveled with a #15 scalpel blade.  Given the location of the defect and the proximity to free margins a trilobed flap was deemed most appropriate.  Using a sterile surgical marker, an appropriate trilobed flap drawn around the defect.    The area thus outlined was incised deep to adipose tissue with a #15 scalpel blade.  The skin margins were undermined to an appropriate distance in all directions utilizing iris scissors. Dorsal Nasal Flap Text: The defect edges were debeveled with a #15 scalpel blade.  Given the location of the defect and the proximity to free margins a dorsal nasal flap was deemed most appropriate.  Using a sterile surgical marker, an appropriate dorsal nasal flap was drawn around the defect.    The area thus outlined was incised deep to adipose tissue with a #15 scalpel blade.  The skin margins were undermined to an appropriate distance in all directions utilizing iris scissors. Island Pedicle Flap Text: The defect edges were debeveled with a #15 scalpel blade.  Given the location of the defect, shape of the defect and the proximity to free margins an island pedicle advancement flap was deemed most appropriate.  Using a sterile surgical marker, an appropriate advancement flap was drawn incorporating the defect, outlining the appropriate donor tissue and placing the expected incisions within the relaxed skin tension lines where possible.    The area thus outlined was incised deep to adipose tissue with a #15 scalpel blade.  The skin margins were undermined to an appropriate distance in all directions around the primary defect and laterally outward around the island pedicle utilizing iris scissors.  There was minimal undermining beneath the pedicle flap. Island Pedicle Flap With Canthal Suspension Text: The defect edges were debeveled with a #15 scalpel blade.  Given the location of the defect, shape of the defect and the proximity to free margins an island pedicle advancement flap was deemed most appropriate.  Using a sterile surgical marker, an appropriate advancement flap was drawn incorporating the defect, outlining the appropriate donor tissue and placing the expected incisions within the relaxed skin tension lines where possible. The area thus outlined was incised deep to adipose tissue with a #15 scalpel blade.  The skin margins were undermined to an appropriate distance in all directions around the primary defect and laterally outward around the island pedicle utilizing iris scissors.  There was minimal undermining beneath the pedicle flap. A suspension suture was placed in the canthal tendon to prevent tension and prevent ectropion. Alar Island Pedicle Flap Text: The defect edges were debeveled with a #15 scalpel blade.  Given the location of the defect, shape of the defect and the proximity to the alar rim an island pedicle advancement flap was deemed most appropriate.  Using a sterile surgical marker, an appropriate advancement flap was drawn incorporating the defect, outlining the appropriate donor tissue and placing the expected incisions within the nasal ala running parallel to the alar rim. The area thus outlined was incised with a #15 scalpel blade.  The skin margins were undermined minimally to an appropriate distance in all directions around the primary defect and laterally outward around the island pedicle utilizing iris scissors.  There was minimal undermining beneath the pedicle flap. Double Island Pedicle Flap Text: The defect edges were debeveled with a #15 scalpel blade.  Given the location of the defect, shape of the defect and the proximity to free margins a double island pedicle advancement flap was deemed most appropriate.  Using a sterile surgical marker, an appropriate advancement flap was drawn incorporating the defect, outlining the appropriate donor tissue and placing the expected incisions within the relaxed skin tension lines where possible.    The area thus outlined was incised deep to adipose tissue with a #15 scalpel blade.  The skin margins were undermined to an appropriate distance in all directions around the primary defect and laterally outward around the island pedicle utilizing iris scissors.  There was minimal undermining beneath the pedicle flap. Island Pedicle Flap-Requiring Vessel Identification Text: The defect edges were debeveled with a #15 scalpel blade.  Given the location of the defect, shape of the defect and the proximity to free margins an island pedicle advancement flap was deemed most appropriate.  Using a sterile surgical marker, an appropriate advancement flap was drawn, based on the axial vessel mentioned above, incorporating the defect, outlining the appropriate donor tissue and placing the expected incisions within the relaxed skin tension lines where possible.    The area thus outlined was incised deep to adipose tissue with a #15 scalpel blade.  The skin margins were undermined to an appropriate distance in all directions around the primary defect and laterally outward around the island pedicle utilizing iris scissors.  There was minimal undermining beneath the pedicle flap. Keystone Flap Text: The defect edges were debeveled with a #15 scalpel blade.  Given the location of the defect, shape of the defect a keystone flap was deemed most appropriate.  Using a sterile surgical marker, an appropriate keystone flap was drawn incorporating the defect, outlining the appropriate donor tissue and placing the expected incisions within the relaxed skin tension lines where possible. The area thus outlined was incised deep to adipose tissue with a #15 scalpel blade.  The skin margins were undermined to an appropriate distance in all directions around the primary defect and laterally outward around the flap utilizing iris scissors. O-T Plasty Text: The defect edges were debeveled with a #15 scalpel blade.  Given the location of the defect, shape of the defect and the proximity to free margins an O-T plasty was deemed most appropriate.  Using a sterile surgical marker, an appropriate O-T plasty was drawn incorporating the defect and placing the expected incisions within the relaxed skin tension lines where possible.    The area thus outlined was incised deep to adipose tissue with a #15 scalpel blade.  The skin margins were undermined to an appropriate distance in all directions utilizing iris scissors. O-Z Plasty Text: The defect edges were debeveled with a #15 scalpel blade.  Given the location of the defect, shape of the defect and the proximity to free margins an O-Z plasty (double transposition flap) was deemed most appropriate.  Using a sterile surgical marker, the appropriate transposition flaps were drawn incorporating the defect and placing the expected incisions within the relaxed skin tension lines where possible.    The area thus outlined was incised deep to adipose tissue with a #15 scalpel blade.  The skin margins were undermined to an appropriate distance in all directions utilizing iris scissors.  Hemostasis was achieved with electrocautery.  The flaps were then transposed into place, one clockwise and the other counterclockwise, and anchored with interrupted buried subcutaneous sutures. Double O-Z Plasty Text: The defect edges were debeveled with a #15 scalpel blade.  Given the location of the defect, shape of the defect and the proximity to free margins a Double O-Z plasty (double transposition flap) was deemed most appropriate.  Using a sterile surgical marker, the appropriate transposition flaps were drawn incorporating the defect and placing the expected incisions within the relaxed skin tension lines where possible. The area thus outlined was incised deep to adipose tissue with a #15 scalpel blade.  The skin margins were undermined to an appropriate distance in all directions utilizing iris scissors.  Hemostasis was achieved with electrocautery.  The flaps were then transposed into place, one clockwise and the other counterclockwise, and anchored with interrupted buried subcutaneous sutures. V-Y Plasty Text: The defect edges were debeveled with a #15 scalpel blade.  Given the location of the defect, shape of the defect and the proximity to free margins an V-Y advancement flap was deemed most appropriate.  Using a sterile surgical marker, an appropriate advancement flap was drawn incorporating the defect and placing the expected incisions within the relaxed skin tension lines where possible.    The area thus outlined was incised deep to adipose tissue with a #15 scalpel blade.  The skin margins were undermined to an appropriate distance in all directions utilizing iris scissors. H Plasty Text: Given the location of the defect, shape of the defect and the proximity to free margins a H-plasty was deemed most appropriate for repair.  Using a sterile surgical marker, the appropriate advancement arms of the H-plasty were drawn incorporating the defect and placing the expected incisions within the relaxed skin tension lines where possible. The area thus outlined was incised deep to adipose tissue with a #15 scalpel blade. The skin margins were undermined to an appropriate distance in all directions utilizing iris scissors.  The opposing advancement arms were then advanced into place in opposite direction and anchored with interrupted buried subcutaneous sutures. W Plasty Text: The lesion was extirpated to the level of the fat with a #15 scalpel blade.  Given the location of the defect, shape of the defect and the proximity to free margins a W-plasty was deemed most appropriate for repair.  Using a sterile surgical marker, the appropriate transposition arms of the W-plasty were drawn incorporating the defect and placing the expected incisions within the relaxed skin tension lines where possible.    The area thus outlined was incised deep to adipose tissue with a #15 scalpel blade.  The skin margins were undermined to an appropriate distance in all directions utilizing iris scissors.  The opposing transposition arms were then transposed into place in opposite direction and anchored with interrupted buried subcutaneous sutures. Z Plasty Text: The lesion was extirpated to the level of the fat with a #15 scalpel blade.  Given the location of the defect, shape of the defect and the proximity to free margins a Z-plasty was deemed most appropriate for repair.  Using a sterile surgical marker, the appropriate transposition arms of the Z-plasty were drawn incorporating the defect and placing the expected incisions within the relaxed skin tension lines where possible.    The area thus outlined was incised deep to adipose tissue with a #15 scalpel blade.  The skin margins were undermined to an appropriate distance in all directions utilizing iris scissors.  The opposing transposition arms were then transposed into place in opposite direction and anchored with interrupted buried subcutaneous sutures. Double Z Plasty Text: The lesion was extirpated to the level of the fat with a #15 scalpel blade. Given the location of the defect, shape of the defect and the proximity to free margins a double Z-plasty was deemed most appropriate for repair. Using a sterile surgical marker, the appropriate transposition arms of the double Z-plasty were drawn incorporating the defect and placing the expected incisions within the relaxed skin tension lines where possible. The area thus outlined was incised deep to adipose tissue with a #15 scalpel blade. The skin margins were undermined to an appropriate distance in all directions utilizing iris scissors. The opposing transposition arms were then transposed and carried over into place in opposite direction and anchored with interrupted buried subcutaneous sutures. Zygomaticofacial Flap Text: Given the location of the defect, shape of the defect and the proximity to free margins a zygomaticofacial flap was deemed most appropriate for repair.  Using a sterile surgical marker, the appropriate flap was drawn incorporating the defect and placing the expected incisions within the relaxed skin tension lines where possible. The area thus outlined was incised deep to adipose tissue with a #15 scalpel blade with preservation of a vascular pedicle.  The skin margins were undermined to an appropriate distance in all directions utilizing iris scissors.  The flap was then placed into the defect and anchored with interrupted buried subcutaneous sutures. Cheek Interpolation Flap Text: A decision was made to reconstruct the defect utilizing an interpolation axial flap and a staged reconstruction.  A telfa template was made of the defect.  This telfa template was then used to outline the Cheek Interpolation flap.  The donor area for the pedicle flap was then injected with anesthesia.  The flap was excised through the skin and subcutaneous tissue down to the layer of the underlying musculature.  The interpolation flap was carefully excised within this deep plane to maintain its blood supply.  The edges of the donor site were undermined.   The donor site was closed in a primary fashion.  The pedicle was then rotated into position and sutured.  Once the tube was sutured into place, adequate blood supply was confirmed with blanching and refill.  The pedicle was then wrapped with xeroform gauze and dressed appropriately with a telfa and gauze bandage to ensure continued blood supply and protect the attached pedicle. Cheek-To-Nose Interpolation Flap Text: A decision was made to reconstruct the defect utilizing an interpolation axial flap and a staged reconstruction.  A telfa template was made of the defect.  This telfa template was then used to outline the Cheek-To-Nose Interpolation flap.  The donor area for the pedicle flap was then injected with anesthesia.  The flap was excised through the skin and subcutaneous tissue down to the layer of the underlying musculature.  The interpolation flap was carefully excised within this deep plane to maintain its blood supply.  The edges of the donor site were undermined.   The donor site was closed in a primary fashion.  The pedicle was then rotated into position and sutured.  Once the tube was sutured into place, adequate blood supply was confirmed with blanching and refill.  The pedicle was then wrapped with xeroform gauze and dressed appropriately with a telfa and gauze bandage to ensure continued blood supply and protect the attached pedicle. Interpolation Flap Text: A decision was made to reconstruct the defect utilizing an interpolation axial flap and a staged reconstruction.  A telfa template was made of the defect.  This telfa template was then used to outline the interpolation flap.  The donor area for the pedicle flap was then injected with anesthesia.  The flap was excised through the skin and subcutaneous tissue down to the layer of the underlying musculature.  The interpolation flap was carefully excised within this deep plane to maintain its blood supply.  The edges of the donor site were undermined.   The donor site was closed in a primary fashion.  The pedicle was then rotated into position and sutured.  Once the tube was sutured into place, adequate blood supply was confirmed with blanching and refill.  The pedicle was then wrapped with xeroform gauze and dressed appropriately with a telfa and gauze bandage to ensure continued blood supply and protect the attached pedicle. Melolabial Interpolation Flap Text: A decision was made to reconstruct the defect utilizing an interpolation axial flap and a staged reconstruction.  A telfa template was made of the defect.  This telfa template was then used to outline the melolabial interpolation flap.  The donor area for the pedicle flap was then injected with anesthesia.  The flap was excised through the skin and subcutaneous tissue down to the layer of the underlying musculature.  The pedicle flap was carefully excised within this deep plane to maintain its blood supply.  The edges of the donor site were undermined.   The donor site was closed in a primary fashion.  The pedicle was then rotated into position and sutured.  Once the tube was sutured into place, adequate blood supply was confirmed with blanching and refill.  The pedicle was then wrapped with xeroform gauze and dressed appropriately with a telfa and gauze bandage to ensure continued blood supply and protect the attached pedicle. Mastoid Interpolation Flap Text: A decision was made to reconstruct the defect utilizing an interpolation axial flap and a staged reconstruction.  A telfa template was made of the defect.  This telfa template was then used to outline the mastoid interpolation flap.  The donor area for the pedicle flap was then injected with anesthesia.  The flap was excised through the skin and subcutaneous tissue down to the layer of the underlying musculature.  The pedicle flap was carefully excised within this deep plane to maintain its blood supply.  The edges of the donor site were undermined.   The donor site was closed in a primary fashion.  The pedicle was then rotated into position and sutured.  Once the tube was sutured into place, adequate blood supply was confirmed with blanching and refill.  The pedicle was then wrapped with xeroform gauze and dressed appropriately with a telfa and gauze bandage to ensure continued blood supply and protect the attached pedicle. Posterior Auricular Interpolation Flap Text: A decision was made to reconstruct the defect utilizing an interpolation axial flap and a staged reconstruction.  A telfa template was made of the defect.  This telfa template was then used to outline the posterior auricular interpolation flap.  The donor area for the pedicle flap was then injected with anesthesia.  The flap was excised through the skin and subcutaneous tissue down to the layer of the underlying musculature.  The pedicle flap was carefully excised within this deep plane to maintain its blood supply.  The edges of the donor site were undermined.   The donor site was closed in a primary fashion.  The pedicle was then rotated into position and sutured.  Once the tube was sutured into place, adequate blood supply was confirmed with blanching and refill.  The pedicle was then wrapped with xeroform gauze and dressed appropriately with a telfa and gauze bandage to ensure continued blood supply and protect the attached pedicle. Paramedian Forehead Flap Text: A decision was made to reconstruct the defect utilizing an interpolation axial flap and a staged reconstruction.  A telfa template was made of the defect.  This telfa template was then used to outline the paramedian forehead pedicle flap.  The donor area for the pedicle flap was then injected with anesthesia.  The flap was excised through the skin and subcutaneous tissue down to the layer of the underlying musculature.  The pedicle flap was carefully excised within this deep plane to maintain its blood supply.  The edges of the donor site were undermined.   The donor site was closed in a primary fashion.  The pedicle was then rotated into position and sutured.  Once the tube was sutured into place, adequate blood supply was confirmed with blanching and refill.  The pedicle was then wrapped with xeroform gauze and dressed appropriately with a telfa and gauze bandage to ensure continued blood supply and protect the attached pedicle. Abbe Flap (Upper To Lower Lip) Text: The defect of the lower lip was assessed and measured.  Given the location and size of the defect, an Abbe flap was deemed most appropriate. Using a sterile surgical marker, an appropriate Abbe flap was measured and drawn on the upper lip. Local anesthesia was then infiltrated.  A scalpel was then used to incise the upper lip through and through the skin, vermilion, muscle and mucosa, leaving the flap pedicled on the opposite side.  The flap was then rotated and transferred to the lower lip defect.  The flap was then sutured into place with a three layer technique, closing the orbicularis oris muscle layer with subcutaneous buried sutures, followed by a mucosal layer and an epidermal layer. Abbe Flap (Lower To Upper Lip) Text: The defect of the upper lip was assessed and measured.  Given the location and size of the defect, an Abbe flap was deemed most appropriate. Using a sterile surgical marker, an appropriate Abbe flap was measured and drawn on the lower lip. Local anesthesia was then infiltrated. A scalpel was then used to incise the upper lip through and through the skin, vermilion, muscle and mucosa, leaving the flap pedicled on the opposite side.  The flap was then rotated and transferred to the lower lip defect.  The flap was then sutured into place with a three layer technique, closing the orbicularis oris muscle layer with subcutaneous buried sutures, followed by a mucosal layer and an epidermal layer. Estlander Flap (Upper To Lower Lip) Text: The defect of the lower lip was assessed and measured.  Given the location and size of the defect, an Estlander flap was deemed most appropriate. Using a sterile surgical marker, an appropriate Estlander flap was measured and drawn on the upper lip. Local anesthesia was then infiltrated. A scalpel was then used to incise the lateral aspect of the flap, through skin, muscle and mucosa, leaving the flap pedicled medially.  The flap was then rotated and positioned to fill the lower lip defect.  The flap was then sutured into place with a three layer technique, closing the orbicularis oris muscle layer with subcutaneous buried sutures, followed by a mucosal layer and an epidermal layer. Lip Wedge Excision Repair Text: Given the location of the defect and the proximity to free margins a full thickness wedge repair was deemed most appropriate.  Using a sterile surgical marker, the appropriate repair was drawn incorporating the defect and placing the expected incisions perpendicular to the vermilion border.  The vermilion border was also meticulously outlined to ensure appropriate reapproximation during the repair.  The area thus outlined was incised through and through with a #15 scalpel blade.  The muscularis and dermis were reaproximated with deep sutures following hemostasis. Care was taken to realign the vermilion border before proceeding with the superficial closure.  Once the vermilion was realigned the superfical and mucosal closure was finished. Ftsg Text: The defect edges were debeveled with a #15 scalpel blade.  Given the location of the defect, shape of the defect and the proximity to free margins a full thickness skin graft was deemed most appropriate.  Using a sterile surgical marker, the primary defect shape was transferred to the donor site. The area thus outlined was incised deep to adipose tissue with a #15 scalpel blade.  The harvested graft was then trimmed of adipose tissue until only dermis and epidermis was left.  The skin margins of the secondary defect were undermined to an appropriate distance in all directions utilizing iris scissors.  The secondary defect was closed with interrupted buried subcutaneous sutures.  The skin edges were then re-apposed with running  sutures.  The skin graft was then placed in the primary defect and oriented appropriately. Split-Thickness Skin Graft Text: The defect edges were debeveled with a #15 scalpel blade.  Given the location of the defect, shape of the defect and the proximity to free margins a split thickness skin graft was deemed most appropriate.  Using a sterile surgical marker, the primary defect shape was transferred to the donor site. The split thickness graft was then harvested.  The skin graft was then placed in the primary defect and oriented appropriately. Pinch Graft Text: The defect edges were debeveled with a #15 scalpel blade. Given the location of the defect, shape of the defect and the proximity to free margins a pinch graft was deemed most appropriate. Using a sterile surgical marker, the primary defect shape was transferred to the donor site. The area thus outlined was incised deep to adipose tissue with a #15 scalpel blade.  The harvested graft was then trimmed of adipose tissue until only dermis and epidermis was left. The skin margins of the secondary defect were undermined to an appropriate distance in all directions utilizing iris scissors.  The secondary defect was closed with interrupted buried subcutaneous sutures.  The skin edges were then re-apposed with running  sutures.  The skin graft was then placed in the primary defect and oriented appropriately. Burow's Graft Text: The defect edges were debeveled with a #15 scalpel blade.  Given the location of the defect, shape of the defect, the proximity to free margins and the presence of a standing cone deformity a Burow's skin graft was deemed most appropriate. The standing cone was removed and this tissue was then trimmed to the shape of the primary defect. The adipose tissue was also removed until only dermis and epidermis were left.  The skin margins of the secondary defect were undermined to an appropriate distance in all directions utilizing iris scissors.  The secondary defect was closed with interrupted buried subcutaneous sutures.  The skin edges were then re-apposed with running  sutures.  The skin graft was then placed in the primary defect and oriented appropriately. Cartilage Graft Text: The defect edges were debeveled with a #15 scalpel blade.  Given the location of the defect, shape of the defect, the fact the defect involved a full thickness cartilage defect a cartilage graft was deemed most appropriate.  An appropriate donor site was identified, cleansed, and anesthetized. The cartilage graft was then harvested and transferred to the recipient site, oriented appropriately and then sutured into place.  The secondary defect was then repaired using a primary closure. Composite Graft Text: The defect edges were debeveled with a #15 scalpel blade.  Given the location of the defect, shape of the defect, the proximity to free margins and the fact the defect was full thickness a composite graft was deemed most appropriate.  The defect was outline and then transferred to the donor site.  A full thickness graft was then excised from the donor site. The graft was then placed in the primary defect, oriented appropriately and then sutured into place.  The secondary defect was then repaired using a primary closure. Epidermal Autograft Text: The defect edges were debeveled with a #15 scalpel blade.  Given the location of the defect, shape of the defect and the proximity to free margins an epidermal autograft was deemed most appropriate.  Using a sterile surgical marker, the primary defect shape was transferred to the donor site. The epidermal graft was then harvested.  The skin graft was then placed in the primary defect and oriented appropriately. Dermal Autograft Text: The defect edges were debeveled with a #15 scalpel blade.  Given the location of the defect, shape of the defect and the proximity to free margins a dermal autograft was deemed most appropriate.  Using a sterile surgical marker, the primary defect shape was transferred to the donor site. The area thus outlined was incised deep to adipose tissue with a #15 scalpel blade.  The harvested graft was then trimmed of adipose and epidermal tissue until only dermis was left.  The skin graft was then placed in the primary defect and oriented appropriately. Skin Substitute Text: The defect edges were debeveled with a #15 scalpel blade.  Given the location of the defect, shape of the defect and the proximity to free margins a skin substitute graft was deemed most appropriate.  The graft material was trimmed to fit the size of the defect. The graft was then placed in the primary defect and oriented appropriately. Tissue Cultured Epidermal Autograft Text: The defect edges were debeveled with a #15 scalpel blade.  Given the location of the defect, shape of the defect and the proximity to free margins a tissue cultured epidermal autograft was deemed most appropriate.  The graft was then trimmed to fit the size of the defect.  The graft was then placed in the primary defect and oriented appropriately. Xenograft Text: The defect edges were debeveled with a #15 scalpel blade.  Given the location of the defect, shape of the defect and the proximity to free margins a xenograft was deemed most appropriate.  The graft was then trimmed to fit the size of the defect.  The graft was then placed in the primary defect and oriented appropriately. Purse String (Intermediate) Text: Given the location of the defect and the characteristics of the surrounding skin a purse string intermediate closure was deemed most appropriate.  Undermining was performed circumferentially around the surgical defect.  A purse string suture was then placed and tightened. Purse String (Simple) Text: Given the location of the defect and the characteristics of the surrounding skin a purse string simple closure was deemed most appropriate.  Undermining was performed circumferentially around the surgical defect.  A purse string suture was then placed and tightened. Complex Repair And Single Advancement Flap Text: The defect edges were debeveled with a #15 scalpel blade.  The primary defect was closed partially with a complex linear closure.  Given the location of the remaining defect, shape of the defect and the proximity to free margins a single advancement flap was deemed most appropriate for complete closure of the defect.  Using a sterile surgical marker, an appropriate advancement flap was drawn incorporating the defect and placing the expected incisions within the relaxed skin tension lines where possible.    The area thus outlined was incised deep to adipose tissue with a #15 scalpel blade.  The skin margins were undermined to an appropriate distance in all directions utilizing iris scissors. Complex Repair And Double Advancement Flap Text: The defect edges were debeveled with a #15 scalpel blade.  The primary defect was closed partially with a complex linear closure.  Given the location of the remaining defect, shape of the defect and the proximity to free margins a double advancement flap was deemed most appropriate for complete closure of the defect.  Using a sterile surgical marker, an appropriate advancement flap was drawn incorporating the defect and placing the expected incisions within the relaxed skin tension lines where possible.    The area thus outlined was incised deep to adipose tissue with a #15 scalpel blade.  The skin margins were undermined to an appropriate distance in all directions utilizing iris scissors. Complex Repair And Modified Advancement Flap Text: The defect edges were debeveled with a #15 scalpel blade.  The primary defect was closed partially with a complex linear closure.  Given the location of the remaining defect, shape of the defect and the proximity to free margins a modified advancement flap was deemed most appropriate for complete closure of the defect.  Using a sterile surgical marker, an appropriate advancement flap was drawn incorporating the defect and placing the expected incisions within the relaxed skin tension lines where possible.    The area thus outlined was incised deep to adipose tissue with a #15 scalpel blade.  The skin margins were undermined to an appropriate distance in all directions utilizing iris scissors. Complex Repair And A-T Advancement Flap Text: The defect edges were debeveled with a #15 scalpel blade.  The primary defect was closed partially with a complex linear closure.  Given the location of the remaining defect, shape of the defect and the proximity to free margins an A-T advancement flap was deemed most appropriate for complete closure of the defect.  Using a sterile surgical marker, an appropriate advancement flap was drawn incorporating the defect and placing the expected incisions within the relaxed skin tension lines where possible.    The area thus outlined was incised deep to adipose tissue with a #15 scalpel blade.  The skin margins were undermined to an appropriate distance in all directions utilizing iris scissors. Complex Repair And O-T Advancement Flap Text: The defect edges were debeveled with a #15 scalpel blade.  The primary defect was closed partially with a complex linear closure.  Given the location of the remaining defect, shape of the defect and the proximity to free margins an O-T advancement flap was deemed most appropriate for complete closure of the defect.  Using a sterile surgical marker, an appropriate advancement flap was drawn incorporating the defect and placing the expected incisions within the relaxed skin tension lines where possible.    The area thus outlined was incised deep to adipose tissue with a #15 scalpel blade.  The skin margins were undermined to an appropriate distance in all directions utilizing iris scissors. Complex Repair And O-L Flap Text: The defect edges were debeveled with a #15 scalpel blade.  The primary defect was closed partially with a complex linear closure.  Given the location of the remaining defect, shape of the defect and the proximity to free margins an O-L flap was deemed most appropriate for complete closure of the defect.  Using a sterile surgical marker, an appropriate flap was drawn incorporating the defect and placing the expected incisions within the relaxed skin tension lines where possible.    The area thus outlined was incised deep to adipose tissue with a #15 scalpel blade.  The skin margins were undermined to an appropriate distance in all directions utilizing iris scissors. Complex Repair And Bilobe Flap Text: The defect edges were debeveled with a #15 scalpel blade.  The primary defect was closed partially with a complex linear closure.  Given the location of the remaining defect, shape of the defect and the proximity to free margins a bilobe flap was deemed most appropriate for complete closure of the defect.  Using a sterile surgical marker, an appropriate advancement flap was drawn incorporating the defect and placing the expected incisions within the relaxed skin tension lines where possible.    The area thus outlined was incised deep to adipose tissue with a #15 scalpel blade.  The skin margins were undermined to an appropriate distance in all directions utilizing iris scissors. Complex Repair And Melolabial Flap Text: The defect edges were debeveled with a #15 scalpel blade.  The primary defect was closed partially with a complex linear closure.  Given the location of the remaining defect, shape of the defect and the proximity to free margins a melolabial flap was deemed most appropriate for complete closure of the defect.  Using a sterile surgical marker, an appropriate advancement flap was drawn incorporating the defect and placing the expected incisions within the relaxed skin tension lines where possible.    The area thus outlined was incised deep to adipose tissue with a #15 scalpel blade.  The skin margins were undermined to an appropriate distance in all directions utilizing iris scissors. Complex Repair And Rotation Flap Text: The defect edges were debeveled with a #15 scalpel blade.  The primary defect was closed partially with a complex linear closure.  Given the location of the remaining defect, shape of the defect and the proximity to free margins a rotation flap was deemed most appropriate for complete closure of the defect.  Using a sterile surgical marker, an appropriate advancement flap was drawn incorporating the defect and placing the expected incisions within the relaxed skin tension lines where possible.    The area thus outlined was incised deep to adipose tissue with a #15 scalpel blade.  The skin margins were undermined to an appropriate distance in all directions utilizing iris scissors. Complex Repair And Rhombic Flap Text: The defect edges were debeveled with a #15 scalpel blade.  The primary defect was closed partially with a complex linear closure.  Given the location of the remaining defect, shape of the defect and the proximity to free margins a rhombic flap was deemed most appropriate for complete closure of the defect.  Using a sterile surgical marker, an appropriate advancement flap was drawn incorporating the defect and placing the expected incisions within the relaxed skin tension lines where possible.    The area thus outlined was incised deep to adipose tissue with a #15 scalpel blade.  The skin margins were undermined to an appropriate distance in all directions utilizing iris scissors. Complex Repair And Transposition Flap Text: The defect edges were debeveled with a #15 scalpel blade.  The primary defect was closed partially with a complex linear closure.  Given the location of the remaining defect, shape of the defect and the proximity to free margins a transposition flap was deemed most appropriate for complete closure of the defect.  Using a sterile surgical marker, an appropriate advancement flap was drawn incorporating the defect and placing the expected incisions within the relaxed skin tension lines where possible.    The area thus outlined was incised deep to adipose tissue with a #15 scalpel blade.  The skin margins were undermined to an appropriate distance in all directions utilizing iris scissors. Complex Repair And V-Y Plasty Text: The defect edges were debeveled with a #15 scalpel blade.  The primary defect was closed partially with a complex linear closure.  Given the location of the remaining defect, shape of the defect and the proximity to free margins a V-Y plasty was deemed most appropriate for complete closure of the defect.  Using a sterile surgical marker, an appropriate advancement flap was drawn incorporating the defect and placing the expected incisions within the relaxed skin tension lines where possible.    The area thus outlined was incised deep to adipose tissue with a #15 scalpel blade.  The skin margins were undermined to an appropriate distance in all directions utilizing iris scissors. Complex Repair And M Plasty Text: The defect edges were debeveled with a #15 scalpel blade.  The primary defect was closed partially with a complex linear closure.  Given the location of the remaining defect, shape of the defect and the proximity to free margins an M plasty was deemed most appropriate for complete closure of the defect.  Using a sterile surgical marker, an appropriate advancement flap was drawn incorporating the defect and placing the expected incisions within the relaxed skin tension lines where possible.    The area thus outlined was incised deep to adipose tissue with a #15 scalpel blade.  The skin margins were undermined to an appropriate distance in all directions utilizing iris scissors. Complex Repair And Double M Plasty Text: The defect edges were debeveled with a #15 scalpel blade.  The primary defect was closed partially with a complex linear closure.  Given the location of the remaining defect, shape of the defect and the proximity to free margins a double M plasty was deemed most appropriate for complete closure of the defect.  Using a sterile surgical marker, an appropriate advancement flap was drawn incorporating the defect and placing the expected incisions within the relaxed skin tension lines where possible.    The area thus outlined was incised deep to adipose tissue with a #15 scalpel blade.  The skin margins were undermined to an appropriate distance in all directions utilizing iris scissors. Complex Repair And W Plasty Text: The defect edges were debeveled with a #15 scalpel blade.  The primary defect was closed partially with a complex linear closure.  Given the location of the remaining defect, shape of the defect and the proximity to free margins a W plasty was deemed most appropriate for complete closure of the defect.  Using a sterile surgical marker, an appropriate advancement flap was drawn incorporating the defect and placing the expected incisions within the relaxed skin tension lines where possible.    The area thus outlined was incised deep to adipose tissue with a #15 scalpel blade.  The skin margins were undermined to an appropriate distance in all directions utilizing iris scissors. Complex Repair And Z Plasty Text: The defect edges were debeveled with a #15 scalpel blade.  The primary defect was closed partially with a complex linear closure.  Given the location of the remaining defect, shape of the defect and the proximity to free margins a Z plasty was deemed most appropriate for complete closure of the defect.  Using a sterile surgical marker, an appropriate advancement flap was drawn incorporating the defect and placing the expected incisions within the relaxed skin tension lines where possible.    The area thus outlined was incised deep to adipose tissue with a #15 scalpel blade.  The skin margins were undermined to an appropriate distance in all directions utilizing iris scissors. Complex Repair And Dorsal Nasal Flap Text: The defect edges were debeveled with a #15 scalpel blade.  The primary defect was closed partially with a complex linear closure.  Given the location of the remaining defect, shape of the defect and the proximity to free margins a dorsal nasal flap was deemed most appropriate for complete closure of the defect.  Using a sterile surgical marker, an appropriate flap was drawn incorporating the defect and placing the expected incisions within the relaxed skin tension lines where possible.    The area thus outlined was incised deep to adipose tissue with a #15 scalpel blade.  The skin margins were undermined to an appropriate distance in all directions utilizing iris scissors. Complex Repair And Ftsg Text: The defect edges were debeveled with a #15 scalpel blade.  The primary defect was closed partially with a complex linear closure.  Given the location of the defect, shape of the defect and the proximity to free margins a full thickness skin graft was deemed most appropriate to repair the remaining defect.  The graft was trimmed to fit the size of the remaining defect.  The graft was then placed in the primary defect, oriented appropriately, and sutured into place. Complex Repair And Burow's Graft Text: The defect edges were debeveled with a #15 scalpel blade.  The primary defect was closed partially with a complex linear closure.  Given the location of the defect, shape of the defect, the proximity to free margins and the presence of a standing cone deformity a Burow's graft was deemed most appropriate to repair the remaining defect.  The graft was trimmed to fit the size of the remaining defect.  The graft was then placed in the primary defect, oriented appropriately, and sutured into place. Complex Repair And Split-Thickness Skin Graft Text: The defect edges were debeveled with a #15 scalpel blade.  The primary defect was closed partially with a complex linear closure.  Given the location of the defect, shape of the defect and the proximity to free margins a split thickness skin graft was deemed most appropriate to repair the remaining defect.  The graft was trimmed to fit the size of the remaining defect.  The graft was then placed in the primary defect, oriented appropriately, and sutured into place. Complex Repair And Epidermal Autograft Text: The defect edges were debeveled with a #15 scalpel blade.  The primary defect was closed partially with a complex linear closure.  Given the location of the defect, shape of the defect and the proximity to free margins an epidermal autograft was deemed most appropriate to repair the remaining defect.  The graft was trimmed to fit the size of the remaining defect.  The graft was then placed in the primary defect, oriented appropriately, and sutured into place. Complex Repair And Dermal Autograft Text: The defect edges were debeveled with a #15 scalpel blade.  The primary defect was closed partially with a complex linear closure.  Given the location of the defect, shape of the defect and the proximity to free margins an dermal autograft was deemed most appropriate to repair the remaining defect.  The graft was trimmed to fit the size of the remaining defect.  The graft was then placed in the primary defect, oriented appropriately, and sutured into place. Complex Repair And Tissue Cultured Epidermal Autograft Text: The defect edges were debeveled with a #15 scalpel blade.  The primary defect was closed partially with a complex linear closure.  Given the location of the defect, shape of the defect and the proximity to free margins an tissue cultured epidermal autograft was deemed most appropriate to repair the remaining defect.  The graft was trimmed to fit the size of the remaining defect.  The graft was then placed in the primary defect, oriented appropriately, and sutured into place. Complex Repair And Xenograft Text: The defect edges were debeveled with a #15 scalpel blade.  The primary defect was closed partially with a complex linear closure.  Given the location of the defect, shape of the defect and the proximity to free margins a xenograft was deemed most appropriate to repair the remaining defect.  The graft was trimmed to fit the size of the remaining defect.  The graft was then placed in the primary defect, oriented appropriately, and sutured into place. Complex Repair And Skin Substitute Graft Text: The defect edges were debeveled with a #15 scalpel blade.  The primary defect was closed partially with a complex linear closure.  Given the location of the remaining defect, shape of the defect and the proximity to free margins a skin substitute graft was deemed most appropriate to repair the remaining defect.  The graft was trimmed to fit the size of the remaining defect.  The graft was then placed in the primary defect, oriented appropriately, and sutured into place. Path Notes (To The Dermatopathologist): Please confirm diagnosis and check margins. Consent was obtained from the patient. The risks and benefits to therapy were discussed in detail. Specifically, the risks of infection, scarring, bleeding, prolonged wound healing, incomplete removal, allergy to anesthesia, nerve injury and recurrence were addressed. Prior to the procedure, the treatment site was clearly identified and confirmed by the patient. All components of Universal Protocol/PAUSE Rule completed. Post-Care Instructions: I reviewed with the patient in detail post-care instructions. Patient is not to engage in any heavy lifting, exercise, or swimming for the next 14 days. Should the patient develop any fevers, chills, bleeding, severe pain patient will contact the office immediately. Home Suture Removal Text: Patient was provided a home suture removal kit and will remove their sutures at home.  If they have any questions or difficulties they will call the office. Where Do You Want The Question To Include Opioid Counseling Located?: Case Summary Tab Billing Type: Third-Party Bill Information: Selecting Yes will display possible errors in your note based on the variables you have selected. This validation is only offered as a suggestion for you. PLEASE NOTE THAT THE VALIDATION TEXT WILL BE REMOVED WHEN YOU FINALIZE YOUR NOTE. IF YOU WANT TO FAX A PRELIMINARY NOTE YOU WILL NEED TO TOGGLE THIS TO 'NO' IF YOU DO NOT WANT IT IN YOUR FAXED NOTE.

## 2023-09-11 ENCOUNTER — APPOINTMENT (RX ONLY)
Dept: URBAN - METROPOLITAN AREA CLINIC 16 | Facility: CLINIC | Age: 83
Setting detail: DERMATOLOGY
End: 2023-09-11

## 2023-09-11 DIAGNOSIS — L57.8 OTHER SKIN CHANGES DUE TO CHRONIC EXPOSURE TO NONIONIZING RADIATION: ICD-10-CM

## 2023-09-11 DIAGNOSIS — D485 NEOPLASM OF UNCERTAIN BEHAVIOR OF SKIN: ICD-10-CM

## 2023-09-11 DIAGNOSIS — L30.9 DERMATITIS, UNSPECIFIED: ICD-10-CM

## 2023-09-11 DIAGNOSIS — Z85.828 PERSONAL HISTORY OF OTHER MALIGNANT NEOPLASM OF SKIN: ICD-10-CM

## 2023-09-11 DIAGNOSIS — L57.0 ACTINIC KERATOSIS: ICD-10-CM

## 2023-09-11 DIAGNOSIS — D18.0 HEMANGIOMA: ICD-10-CM

## 2023-09-11 DIAGNOSIS — L82.1 OTHER SEBORRHEIC KERATOSIS: ICD-10-CM

## 2023-09-11 DIAGNOSIS — D22 MELANOCYTIC NEVI: ICD-10-CM

## 2023-09-11 DIAGNOSIS — Z86.006 PERSONAL HISTORY OF MELANOMA IN-SITU: ICD-10-CM

## 2023-09-11 DIAGNOSIS — L81.4 OTHER MELANIN HYPERPIGMENTATION: ICD-10-CM

## 2023-09-11 PROBLEM — D18.01 HEMANGIOMA OF SKIN AND SUBCUTANEOUS TISSUE: Status: ACTIVE | Noted: 2023-09-11

## 2023-09-11 PROBLEM — D22.5 MELANOCYTIC NEVI OF TRUNK: Status: ACTIVE | Noted: 2023-09-11

## 2023-09-11 PROBLEM — D48.5 NEOPLASM OF UNCERTAIN BEHAVIOR OF SKIN: Status: ACTIVE | Noted: 2023-09-11

## 2023-09-11 PROCEDURE — ? LIQUID NITROGEN

## 2023-09-11 PROCEDURE — ? TREATMENT REGIMEN

## 2023-09-11 PROCEDURE — 11105 PUNCH BX SKIN EA SEP/ADDL: CPT

## 2023-09-11 PROCEDURE — ? PRESCRIPTION MEDICATION MANAGEMENT

## 2023-09-11 PROCEDURE — ? DIAGNOSIS COMMENT

## 2023-09-11 PROCEDURE — ? COUNSELING

## 2023-09-11 PROCEDURE — ? SUNSCREEN RECOMMENDATIONS

## 2023-09-11 PROCEDURE — ? PRESCRIPTION

## 2023-09-11 PROCEDURE — 17000 DESTRUCT PREMALG LESION: CPT | Mod: 59

## 2023-09-11 PROCEDURE — ? FULL BODY SKIN EXAM

## 2023-09-11 PROCEDURE — 99213 OFFICE O/P EST LOW 20 MIN: CPT | Mod: 25

## 2023-09-11 PROCEDURE — 11104 PUNCH BX SKIN SINGLE LESION: CPT

## 2023-09-11 PROCEDURE — ? BIOPSY BY SHAVE METHOD

## 2023-09-11 PROCEDURE — 11103 TANGNTL BX SKIN EA SEP/ADDL: CPT

## 2023-09-11 PROCEDURE — ? BIOPSY BY PUNCH METHOD

## 2023-09-11 PROCEDURE — 17003 DESTRUCT PREMALG LES 2-14: CPT

## 2023-09-11 RX ORDER — CLOBETASOL PROPIONATE 0.5 MG/G
CREAM TOPICAL
Qty: 60 | Refills: 1 | Status: ERX | COMMUNITY
Start: 2023-09-11

## 2023-09-11 RX ADMIN — CLOBETASOL PROPIONATE: 0.5 CREAM TOPICAL at 00:00

## 2023-09-11 ASSESSMENT — LOCATION SIMPLE DESCRIPTION DERM
LOCATION SIMPLE: LEFT UPPER BACK
LOCATION SIMPLE: LEFT FOREARM
LOCATION SIMPLE: RIGHT TEMPLE
LOCATION SIMPLE: NOSE
LOCATION SIMPLE: RIGHT ANTERIOR NECK
LOCATION SIMPLE: LEFT FOREHEAD
LOCATION SIMPLE: RIGHT THIGH
LOCATION SIMPLE: ANTERIOR SCALP
LOCATION SIMPLE: RIGHT BACK
LOCATION SIMPLE: RIGHT CLAVICULAR SKIN
LOCATION SIMPLE: SUPERIOR FOREHEAD
LOCATION SIMPLE: SCALP
LOCATION SIMPLE: LEFT EAR
LOCATION SIMPLE: CHEST
LOCATION SIMPLE: RIGHT SHOULDER
LOCATION SIMPLE: ABDOMEN
LOCATION SIMPLE: RIGHT EAR
LOCATION SIMPLE: LEFT LOWER BACK
LOCATION SIMPLE: LEFT HAND
LOCATION SIMPLE: LEFT THIGH
LOCATION SIMPLE: RIGHT FOREHEAD
LOCATION SIMPLE: RIGHT UPPER BACK
LOCATION SIMPLE: RIGHT FOREARM

## 2023-09-11 ASSESSMENT — LOCATION DETAILED DESCRIPTION DERM
LOCATION DETAILED: LEFT DORSAL MIDDLE METACARPOPHALANGEAL JOINT
LOCATION DETAILED: RIGHT INFERIOR LATERAL NECK
LOCATION DETAILED: LEFT SUPERIOR MEDIAL MIDBACK
LOCATION DETAILED: RIGHT SUPERIOR LATERAL UPPER BACK
LOCATION DETAILED: RIGHT SUPERIOR PARIETAL SCALP
LOCATION DETAILED: LEFT SUPERIOR PARIETAL SCALP
LOCATION DETAILED: LEFT SUPERIOR MEDIAL FOREHEAD
LOCATION DETAILED: RIGHT DISTAL DORSAL FOREARM
LOCATION DETAILED: RIGHT ANTERIOR DISTAL THIGH
LOCATION DETAILED: RIGHT ANTERIOR SHOULDER
LOCATION DETAILED: LEFT SUPERIOR POSTERIOR HELIX
LOCATION DETAILED: LEFT LATERAL SUPERIOR CHEST
LOCATION DETAILED: RIGHT ANTERIOR PROXIMAL THIGH
LOCATION DETAILED: RIGHT MID TEMPLE
LOCATION DETAILED: LEFT VENTRAL DISTAL FOREARM
LOCATION DETAILED: MID-FRONTAL SCALP
LOCATION DETAILED: NASAL SUPRATIP
LOCATION DETAILED: SUPERIOR MID FOREHEAD
LOCATION DETAILED: RIGHT SUPERIOR POSTERIOR HELIX
LOCATION DETAILED: RIGHT MEDIAL INFERIOR CHEST
LOCATION DETAILED: LEFT ANTERIOR PROXIMAL THIGH
LOCATION DETAILED: RIGHT MID-UPPER BACK
LOCATION DETAILED: LEFT SUPERIOR UPPER BACK
LOCATION DETAILED: RIGHT CLAVICULAR SKIN
LOCATION DETAILED: LEFT DISTAL DORSAL FOREARM
LOCATION DETAILED: LEFT VENTRAL PROXIMAL FOREARM
LOCATION DETAILED: RIGHT SUPERIOR MEDIAL FOREHEAD
LOCATION DETAILED: RIGHT SUPERIOR FOREHEAD
LOCATION DETAILED: LEFT RIB CAGE

## 2023-09-11 ASSESSMENT — LOCATION ZONE DERM
LOCATION ZONE: HAND
LOCATION ZONE: EAR
LOCATION ZONE: NOSE
LOCATION ZONE: ARM
LOCATION ZONE: FACE
LOCATION ZONE: SCALP
LOCATION ZONE: NECK
LOCATION ZONE: TRUNK
LOCATION ZONE: LEG

## 2023-09-11 NOTE — PROCEDURE: BIOPSY BY PUNCH METHOD
Detail Level: Simple
Was A Bandage Applied: Yes
Punch Size In Mm: 4
Size Of Lesion In Cm (Optional): 0
Depth Of Punch Biopsy: dermis
Biopsy Type: H and E
Anesthesia Type: 1% lidocaine with 1:100,000 epinephrine
Anesthesia Volume In Cc (Will Not Render If 0): 1
Hemostasis: None
Epidermal Sutures: 4-0 Nylon
Wound Care: Petrolatum
Dressing: pressure dressing with telfa
Suture Removal: 14 days
Patient Will Remove Sutures At Home?: No
Lab: -[id number]
Path Notes Override (Will Replace All Of The Above Text): Onset about 3 weeks ago. No clear inciting trigger. Patient has been using topical triamcinolone cream with some improvement. Reviewed potential for non-diagnostic biopsy given that he has been using topical steroid
Consent: Written consent was obtained and risks were reviewed including but not limited to scarring, infection, bleeding, scabbing, incomplete removal, nerve damage and allergy to anesthesia.
Post-Care Instructions: I reviewed with the patient in detail post-care instructions. Patient is to keep the biopsy site dry overnight, and then apply bacitracin twice daily until healed. Patient may apply hydrogen peroxide soaks to remove any crusting.
Home Suture Removal Text: Patient was provided a home suture removal kit and will remove their sutures at home.  If they have any questions or difficulties they will call the office.
Notification Instructions: Patient will be notified of biopsy results. However, patient instructed to call the office if not contacted within 2 weeks.
Billing Type: Third-Party Bill
Information: Selecting Yes will display possible errors in your note based on the variables you have selected. This validation is only offered as a suggestion for you. PLEASE NOTE THAT THE VALIDATION TEXT WILL BE REMOVED WHEN YOU FINALIZE YOUR NOTE. IF YOU WANT TO FAX A PRELIMINARY NOTE YOU WILL NEED TO TOGGLE THIS TO 'NO' IF YOU DO NOT WANT IT IN YOUR FAXED NOTE.

## 2023-09-11 NOTE — PROCEDURE: PRESCRIPTION MEDICATION MANAGEMENT
Detail Level: Zone
Render In Strict Bullet Format?: No
Discontinue Regimen: Triamcinolone cream
Initiate Treatment: clobetasol 0.05 % topical cream. Only as needed for flares: apply to affected areas (rash) twice daily as needed, stop when improved. If needed beyond 2 consecutive weeks, decrease frequency of use to Mon-Fri, off on weekends. Avoid application to face/armpits/groin.

## 2023-09-11 NOTE — PROCEDURE: DIAGNOSIS COMMENT
Detail Level: Simple
Render Risk Assessment In Note?: yes
Comment: Patient reports history of BCC on left nasal tip and right shoulder treated with Mohs surgery (biopsy in [place] and treated by [facility]) 6/2023 - to sign record release today \\nBCC, nodular and sclerosing, right inferior lateral neck, s/p excision 8/2022 \\nBCC Right Superior Posterior Helix, s/p MOHS 11/2015\\nSCC in situ Left Superior Helix, s/p MOHS 11/2015\\nBCC Right Superior Posterior Helix s/p MOHS 2/2016  \\nSCC in situ Left Inferior Central Malar Cheek, s/p LN2 treatment 3/2018\\nBCC Left Nasal Ala, s/p MOHS 5/2018\\nSCC in situ Right Superior Posterior Helix, s/p LN2 treatment 4/2020\\nSCC in situ Left Superior Parietal Scalp, cryosurgical destruction 11/2021
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
Comment: DDx dermal hypersensitivity reaction vs lichenoid drug eruption vs other. Onset about 3 weeks ago. No clear inciting trigger (most recent med for prostate started about 6 months ago per patient). Patient has been using topical triamcinolone cream with some improvement. Reviewed potential for non-diagnostic biopsy given that he has been using topical steroid, he stated understanding
Comment: Patient to see his [place] dermatologist over the winter - discussed consideration for 5-FU to face, scalp, forearms

## 2023-09-11 NOTE — PROCEDURE: LIQUID NITROGEN
Duration Of Freeze Thaw-Cycle (Seconds): 7
Render Note In Bullet Format When Appropriate: No
Show Applicator Variable?: Yes
Consent: The patient's consent was obtained including but not limited to risks of crusting, scabbing, blistering, scarring, darker or lighter pigmentary change, recurrence, incomplete removal and infection.
Detail Level: Simple
Number Of Freeze-Thaw Cycles: 1 freeze-thaw cycle
Post-Care Instructions: I reviewed with the patient in detail post-care instructions. Patient is to wear sun protection and avoid picking at any of the treated lesions. Patient may apply Vaseline to crusted or scabbing areas.

## 2023-09-20 ENCOUNTER — APPOINTMENT (RX ONLY)
Dept: URBAN - METROPOLITAN AREA CLINIC 16 | Facility: CLINIC | Age: 83
Setting detail: DERMATOLOGY
End: 2023-09-20

## 2023-09-20 DIAGNOSIS — L28.0 LICHEN SIMPLEX CHRONICUS: ICD-10-CM | Status: RESOLVING

## 2023-09-20 DIAGNOSIS — L43.2 LICHENOID DRUG REACTION: ICD-10-CM | Status: RESOLVING

## 2023-09-20 DIAGNOSIS — Z48.02 ENCOUNTER FOR REMOVAL OF SUTURES: ICD-10-CM

## 2023-09-20 DIAGNOSIS — Z85.820 PERSONAL HISTORY OF MALIGNANT MELANOMA OF SKIN: ICD-10-CM

## 2023-09-20 PROBLEM — L30.9 DERMATITIS, UNSPECIFIED: Status: ACTIVE | Noted: 2023-09-20

## 2023-09-20 PROCEDURE — ? PRESCRIPTION MEDICATION MANAGEMENT

## 2023-09-20 PROCEDURE — 99213 OFFICE O/P EST LOW 20 MIN: CPT | Mod: 24

## 2023-09-20 PROCEDURE — 15853 REMOVAL SUTR/STAPL XREQ ANES: CPT

## 2023-09-20 PROCEDURE — ? SUTURE REMOVAL

## 2023-09-20 PROCEDURE — ? COUNSELING

## 2023-09-20 PROCEDURE — ? PATHOLOGY DISCUSSION

## 2023-09-20 PROCEDURE — ? DIAGNOSIS COMMENT

## 2023-09-20 ASSESSMENT — LOCATION DETAILED DESCRIPTION DERM
LOCATION DETAILED: RIGHT MEDIAL INFERIOR CHEST
LOCATION DETAILED: RIGHT ANTERIOR PROXIMAL THIGH
LOCATION DETAILED: RIGHT SUPERIOR LATERAL UPPER BACK
LOCATION DETAILED: LEFT ANTERIOR PROXIMAL THIGH
LOCATION DETAILED: LEFT LATERAL SUPERIOR CHEST
LOCATION DETAILED: LEFT SUPERIOR UPPER BACK
LOCATION DETAILED: RIGHT CLAVICULAR SKIN

## 2023-09-20 ASSESSMENT — LOCATION SIMPLE DESCRIPTION DERM
LOCATION SIMPLE: LEFT UPPER BACK
LOCATION SIMPLE: RIGHT CLAVICULAR SKIN
LOCATION SIMPLE: RIGHT UPPER BACK
LOCATION SIMPLE: CHEST
LOCATION SIMPLE: LEFT THIGH
LOCATION SIMPLE: RIGHT THIGH

## 2023-09-20 ASSESSMENT — LOCATION ZONE DERM
LOCATION ZONE: TRUNK
LOCATION ZONE: LEG

## 2023-09-20 NOTE — PROCEDURE: DIAGNOSIS COMMENT
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 All of his medications could be implicated in lichenoid drug eruption - lisinopril, metoprolol, atorvastatin, tamsulosin but tamsulosin is newest and he is been on it for about 6 months. Eruption started just over a month ago. He denies any OTC medications, including NSAIDs or recent antibiotics or supplements. \\n\\nResolving on exam today and patient is asymptomatic - almost entirely clear on trunk and thighs are fading. Given fading despite continuing all prescription medications, plan to hold off on speaking to PCP about changing tamsulosin for now. Advised patient to call if rash recurs. Comment: Biopsy-proven lichenoid dermatitis under [id number]. All of his medications could be implicated in lichenoid drug eruption - lisinopril, metoprolol, atorvastatin, tamsulosin but tamsulosin is newest and he is been on it for about 6 months. Eruption started just over a month ago. He denies any OTC medications, including NSAIDs or recent antibiotics or supplements. \\n\\nResolving on exam today and patient is asymptomatic - almost entirely clear on trunk and thighs are fading. Given fading despite continuing all prescription medications, plan to hold off on speaking to PCP about changing tamsulosin for now. Advised patient to call if rash recurs.

## 2023-09-20 NOTE — PROCEDURE: SUTURE REMOVAL
Add 1585x Cpt? (Do Not Bill If You Billed For The Procedure Placing The Sutures. This Is An Add-On Code That Must Be Billed With An E/M Visit Code): Yes - 11211 Add 1585x Cpt? (Do Not Bill If You Billed For The Procedure Placing The Sutures. This Is An Add-On Code That Must Be Billed With An E/M Visit Code): Yes - 46238

## 2023-09-20 NOTE — PROCEDURE: SUTURE REMOVAL
Add 1585x Cpt? (Do Not Bill If You Billed For The Procedure Placing The Sutures. This Is An Add-On Code That Must Be Billed With An E/M Visit Code): Yes - 00231

## 2023-09-20 NOTE — PROCEDURE: DIAGNOSIS COMMENT
Comment: Patient has had 3 MMIS/stage IA melanoma. Denies any family history of melanoma, no family history of pancreatic cancer, colon or breast cancer. Holding off on referral to genetics. Patient had extensive sun exposure as a child. \\n\\nHas TBSEs in [place] - next scheduled this fall per patient

## 2023-09-20 NOTE — PROCEDURE: PRESCRIPTION MEDICATION MANAGEMENT
Detail Level: Zone
Render In Strict Bullet Format?: No
Discontinue Regimen: Triamcinolone cream
Initiate Treatment: If flare occurs, then please call the office and would likely call PCP to see if possible to change a prescription medication
Continue Regimen: Only as needed for flare: clobetasol 0.05 % topical cream. Only as needed for flares: apply to affected areas (rash) twice daily as needed, stop when improved. If needed beyond 2 consecutive weeks, decrease frequency of use to Mon-Fri, off on weekends. Avoid application to face/armpits/groin.

## 2023-10-04 ENCOUNTER — APPOINTMENT (OUTPATIENT)
Dept: URBAN - METROPOLITAN AREA CLINIC 195 | Age: 83
Setting detail: DERMATOLOGY
End: 2023-10-06

## 2023-10-04 VITALS — SYSTOLIC BLOOD PRESSURE: 140 MMHG | DIASTOLIC BLOOD PRESSURE: 68 MMHG

## 2023-10-04 DIAGNOSIS — L90.5 SCAR CONDITIONS AND FIBROSIS OF SKIN: ICD-10-CM

## 2023-10-04 DIAGNOSIS — Z48.817 ENCOUNTER FOR SURGICAL AFTERCARE FOLLOWING SURGERY ON THE SKIN AND SUBCUTANEOUS TISSUE: ICD-10-CM

## 2023-10-04 DIAGNOSIS — L57.8 OTHER SKIN CHANGES DUE TO CHRONIC EXPOSURE TO NONIONIZING RADIATION: ICD-10-CM

## 2023-10-04 DIAGNOSIS — Z12.83 ENCOUNTER FOR SCREENING FOR MALIGNANT NEOPLASM OF SKIN: ICD-10-CM

## 2023-10-04 DIAGNOSIS — Z85.820 PERSONAL HISTORY OF MALIGNANT MELANOMA OF SKIN: ICD-10-CM

## 2023-10-04 PROCEDURE — OTHER MIPS QUALITY: OTHER

## 2023-10-04 PROCEDURE — OTHER COUNSELING: OTHER

## 2023-10-04 PROCEDURE — 99213 OFFICE O/P EST LOW 20 MIN: CPT

## 2023-10-04 ASSESSMENT — LOCATION ZONE DERM: LOCATION ZONE: NOSE

## 2023-10-04 ASSESSMENT — LOCATION SIMPLE DESCRIPTION DERM: LOCATION SIMPLE: NOSE

## 2023-10-04 ASSESSMENT — LOCATION DETAILED DESCRIPTION DERM: LOCATION DETAILED: NASAL TIP

## 2024-02-27 NOTE — PROCEDURE: EXCISION
Handoff Double O-Z Plasty Text: The defect edges were debeveled with a #15 scalpel blade.  Given the location of the defect, shape of the defect and the proximity to free margins a Double O-Z plasty (double transposition flap) was deemed most appropriate.  Using a sterile surgical marker, the appropriate transposition flaps were drawn incorporating the defect and placing the expected incisions within the relaxed skin tension lines where possible. The area thus outlined was incised deep to adipose tissue with a #15 scalpel blade.  The skin margins were undermined to an appropriate distance in all directions utilizing iris scissors.  Hemostasis was achieved with electrocautery.  The flaps were then transposed into place, one clockwise and the other counterclockwise, and anchored with interrupted buried subcutaneous sutures.

## 2024-04-18 NOTE — PROCEDURE: MOHS SURGERY
Assessment:      Healthy 15 m.o. female child.     1. Encounter for well child visit at 15 months of age    2. Encounter for immunization  -     DTAP HIB IPV COMBINED VACCINE IM (PENTACEL)  -     Pneumococcal Conjugate Vaccine 20-valent (Pcv20)  -     acetaminophen (TYLENOL) 160 mg/5 mL suspension; Take 4.2 mL (134.4 mg total) by mouth every 6 (six) hours as needed for mild pain or fever    3. SGA (small for gestational age)  Comments:  Growing well.    4. Infantile eczema  Comments:  Reviewed skin care.  Call if worsens or persist.  Orders:  -     hydrocortisone 2.5 % cream; Apply topically 2 (two) times a day for 14 days       Plan:          1. Anticipatory guidance discussed.  Gave handout on well-child issues at this age.    2. Development: appropriate for age    3. Immunizations today: per orders.  Discussed with: parents    4. Follow-up visit in 3 months for next well child visit, or sooner as needed.          Subjective:       Cheryl Guidry is a 15 m.o. female who is brought in for this well child visit.      Current Issues:  Current concerns include eczema was better so stopped hydrocortisone and now has a patch on her right arm.  Using all gentle and baby products.  Reviewed skin care..    Well Child Assessment:  History was provided by the mother and father. Cheryl lives with her mother and father.   Nutrition  Types of intake include cow's milk, meats, fruits and vegetables. 20 ounces of milk or formula are consumed every 24 hours.   Dental  The patient does not have a dental home (gave info).   Elimination  Elimination problems do not include constipation or urinary symptoms.   Sleep  The patient sleeps in her crib or parents' bed. Average sleep duration is 8 hours.   Safety  Home is child-proofed? yes. There is no smoking in the home. Home has working smoke alarms? yes. Home has working carbon monoxide alarms? yes. There is an appropriate car seat in use.   Screening  Immunizations are not  up-to-date. There are no risk factors for hearing loss. There are no risk factors for anemia. There are no risk factors for oral health.   Social  The caregiver enjoys the child. Childcare is provided at child's home. The childcare provider is a parent or relative. Sibling interactions are good.       The following portions of the patient's history were reviewed and updated as appropriate: allergies, current medications, past family history, past medical history, past social history, past surgical history, and problem list.    Developmental 12 Months Appropriate     Question Response Comments    Will play peek-a-parrish Yes  Yes on 1/17/2024 (Age - 12 m)    Will hold on to objects hard enough that it takes effort to get them back Yes  Yes on 1/17/2024 (Age - 12 m)    Can stand holding on to furniture for 30 seconds or more Yes  Yes on 1/17/2024 (Age - 12 m)    Makes 'mama' or 'julieth' sounds Yes  Yes on 1/17/2024 (Age - 12 m)    Can go from sitting to standing without help Yes  Yes on 1/17/2024 (Age - 12 m)    Uses 'pincer grasp' between thumb and fingers to  small objects Yes  Yes on 1/17/2024 (Age - 12 m)    Can tell parent/caretaker from strangers Yes  Yes on 1/17/2024 (Age - 12 m)    Can go from supine to sitting without help Yes  Yes on 1/17/2024 (Age - 12 m)    Tries to imitate spoken sounds (not necessarily complete words) Yes  Yes on 1/17/2024 (Age - 12 m)    Can bang 2 small objects together to make sounds Yes  Yes on 1/17/2024 (Age - 12 m)      Developmental 15 Months Appropriate     Question Response Comments    Can walk alone or holding on to furniture Yes  Yes on 4/18/2024 (Age - 15 m)    Can play 'pat-a-cake' or wave 'bye-bye' without help Yes  Yes on 4/18/2024 (Age - 15 m)    Refers to parent/caretaker by saying 'mama,' 'julieth,' or equivalent Yes  Yes on 4/18/2024 (Age - 15 m)    Can stand unsupported for 5 seconds Yes  Yes on 4/18/2024 (Age - 15 m)    Can stand unsupported for 30 seconds Yes  Yes on  "4/18/2024 (Age - 15 m)    Can bend over to  an object on floor and stand up again without support Yes  Yes on 4/18/2024 (Age - 15 m)    Can indicate wants without crying/whining (pointing, etc.) Yes  Yes on 4/18/2024 (Age - 15 m)    Can walk across a large room without falling or wobbling from side to side Yes  Yes on 4/18/2024 (Age - 15 m)                  Objective:      Growth parameters are noted and are appropriate for age.    Wt Readings from Last 1 Encounters:   04/18/24 9.1 kg (20 lb 1 oz) (32%, Z= -0.47)*     * Growth percentiles are based on WHO (Girls, 0-2 years) data.     Ht Readings from Last 1 Encounters:   04/18/24 30\" (76.2 cm) (29%, Z= -0.55)*     * Growth percentiles are based on WHO (Girls, 0-2 years) data.      Head Circumference: 45.7 cm (18\")      Vitals:    04/18/24 1451   Temp: 98.6 °F (37 °C)   TempSrc: Tympanic   Weight: 9.1 kg (20 lb 1 oz)   Height: 30\" (76.2 cm)   HC: 45.7 cm (18\")        Physical Exam  Vitals and nursing note reviewed.   Constitutional:       General: She is active. She is not in acute distress.     Appearance: Normal appearance. She is well-developed and normal weight.   HENT:      Head: Normocephalic and atraumatic.      Right Ear: Tympanic membrane normal.      Left Ear: Tympanic membrane normal.      Nose: Nose normal.      Mouth/Throat:      Mouth: Mucous membranes are moist.      Pharynx: Oropharynx is clear.   Eyes:      General: Red reflex is present bilaterally.      Extraocular Movements: Extraocular movements intact.      Conjunctiva/sclera: Conjunctivae normal.      Pupils: Pupils are equal, round, and reactive to light.   Cardiovascular:      Rate and Rhythm: Normal rate and regular rhythm.      Pulses: Normal pulses.      Heart sounds: Normal heart sounds. No murmur heard.  Pulmonary:      Effort: Pulmonary effort is normal. No respiratory distress.      Breath sounds: Normal breath sounds.   Abdominal:      General: Bowel sounds are normal. There is " no distension.      Palpations: Abdomen is soft. There is no mass.      Tenderness: There is no abdominal tenderness. There is no guarding.   Genitourinary:     General: Normal vulva.   Musculoskeletal:         General: No swelling or deformity. Normal range of motion.      Cervical back: Normal range of motion and neck supple.   Lymphadenopathy:      Cervical: No cervical adenopathy.   Skin:     General: Skin is warm and dry.      Capillary Refill: Capillary refill takes less than 2 seconds.      Findings: Rash present.      Comments: Small dry thickened patch right lateral antecubital   Neurological:      General: No focal deficit present.      Mental Status: She is alert and oriented for age.      Motor: No weakness.      Coordination: Coordination normal.      Gait: Gait normal.         Review of Systems   Gastrointestinal:  Negative for constipation.      X Size Of Lesion In Cm (Optional): 0.8

## 2024-04-19 NOTE — PROCEDURE: MOHS SURGERY
No Unique Flap 3 Text: Because of the through-and-through defect of the lateral ala, in order to restore the nose and maintain an open airway, a island-pedicle Spear flap was planned with cheek advancement flap.  After prep and anesthesia, a template was made of the right side of the lip and transferred to the left side to outline the normal anatomic position of the triangular portion of the upper lip.  A template was then made of the lining and outer defect of the left ala and transferred to the medial cheek adjacent to the nasolabial fold.  An island-pedicle flap was incised and elevated and carefully dissected to a muscular subcutaneous pedicle based near the piriform aperture and ascending portion of the maxilla.  This was carefully turned/carried over and sutured to line the ala, then turned up on itself where it was sutured in a layered fashion.  \\n\\nTo close the donor site defect, a cheek flap was elevated by undermining in the subcutaneous plane lateral to the lateral canthus  After hemostasis was obtained the flap was carried over medially, attached to the piriform aperture of the axilla and ascending portion of the maxilla, and then closed in a layered fashion.  The advancement flap measured 3 x 4 cm.

## 2024-08-14 ENCOUNTER — APPOINTMENT (OUTPATIENT)
Dept: URBAN - METROPOLITAN AREA CLINIC 195 | Age: 84
Setting detail: DERMATOLOGY
End: 2024-08-20

## 2024-08-14 VITALS — HEIGHT: 69 IN | WEIGHT: 208 LBS | SYSTOLIC BLOOD PRESSURE: 144 MMHG | DIASTOLIC BLOOD PRESSURE: 82 MMHG

## 2024-08-14 DIAGNOSIS — L57.8 OTHER SKIN CHANGES DUE TO CHRONIC EXPOSURE TO NONIONIZING RADIATION: ICD-10-CM

## 2024-08-14 DIAGNOSIS — L82.1 OTHER SEBORRHEIC KERATOSIS: ICD-10-CM

## 2024-08-14 DIAGNOSIS — D18.0 HEMANGIOMA: ICD-10-CM

## 2024-08-14 DIAGNOSIS — Z12.83 ENCOUNTER FOR SCREENING FOR MALIGNANT NEOPLASM OF SKIN: ICD-10-CM

## 2024-08-14 PROBLEM — C44.519 BASAL CELL CARCINOMA OF SKIN OF OTHER PART OF TRUNK: Status: ACTIVE | Noted: 2024-08-14

## 2024-08-14 PROBLEM — D18.01 HEMANGIOMA OF SKIN AND SUBCUTANEOUS TISSUE: Status: ACTIVE | Noted: 2024-08-14

## 2024-08-14 PROCEDURE — OTHER SUNSCREEN RECOMMENDATIONS: OTHER

## 2024-08-14 PROCEDURE — 17313 MOHS 1 STAGE T/A/L: CPT

## 2024-08-14 PROCEDURE — OTHER MOHS SURGERY: OTHER

## 2024-08-14 PROCEDURE — OTHER COUNSELING: OTHER

## 2024-08-14 PROCEDURE — OTHER ASC CONSULTATION: OTHER

## 2024-08-14 PROCEDURE — 99213 OFFICE O/P EST LOW 20 MIN: CPT | Mod: 25

## 2024-08-14 PROCEDURE — 13101 CMPLX RPR TRUNK 2.6-7.5 CM: CPT

## 2024-08-14 PROCEDURE — OTHER MIPS QUALITY: OTHER

## 2024-08-14 NOTE — PROCEDURE: MIPS QUALITY
Quality 130: Documentation Of Current Medications In The Medical Record: Current Medications Documented
Quality 226: Preventive Care And Screening: Tobacco Use: Screening And Cessation Intervention: Patient screened for tobacco use and is an ex/non-smoker
Quality 47: Advance Care Plan: Advance Care Planning discussed and documented; advance care plan or surrogate decision maker documented in the medical record.
Detail Level: Detailed
Additional Notes: Patient was entered into an external recall system that includes a target date for the next full skin exam and a process to follow up with the patient if they do not follow up within the specified timeframe or if they miss there scheduled appointment.
Quality 137: Melanoma: Continuity Of Care - Recall System: Patient information entered into a recall system that includes: target date for the next exam specified AND a process to follow up with patients regarding missed or unscheduled appointments

## 2024-08-14 NOTE — PROCEDURE: MOHS SURGERY
O-Z Plasty Text: Because of the full-thickness nature of the defect and location adjacent to important structure(s), a bilateral rotation flap (O to Z) was planned. After prep and anesthesia, arcuate incisions were extended from two opposite side of the wound.  Two flaps were created by undermining in the subcutaneous plane. After hemostasis was obtained, the flaps were advanced and sutured in a layered fashion.
Fibroepithelioma Of Pinkus Histology Text: There were aggregates of basaloid cells consistent with fibroepithelioma of pinks.
Epidermal Closure Graft Donor Site (Optional): running
Hemigard Retention Suture: 2-0 Nylon
Show Referring Provider Variable: Yes
Brow Lift Text: A midfrontal incision was made medially to the defect to allow access to the tissues just superior to the left eyebrow. Following careful dissection inferiorly in a supraperiosteal plane to the level of the left eyebrow, several 3-0 monocryl sutures were used to resuspend the eyebrow orbicularis oculi muscular unit to the superior frontal bone periosteum. This resulted in an appropriate reapproximation of static eyebrow symmetry and correction of the left brow ptosis.
Island Pedicle Flap-Requiring Vessel Identification Text: Due to the location on free margin and to restore and maintain airway, an alar IPF was planned.  Given the location of the defect, shape of the defect and the proximity to free margins an island pedicle advancement flap was deemed most appropriate.  Using a sterile surgical marker, an appropriate advancement flap was drawn, based on the axial vessel mentioned above, incorporating the defect, outlining the appropriate donor tissue and placing the expected incisions within the relaxed skin tension lines where possible.    The area thus outlined was incised deep to adipose tissue with a #15 scalpel blade.  The skin margins were undermined to an appropriate distance in all directions around the primary defect and laterally outward around the island pedicle utilizing iris scissors.  There was minimal undermining beneath the pedicle flap.
Cheiloplasty (Complex) Text: In order to maintain form and function of the lip, and to avoid distortion of the free margin, a lip advancement flap was planned. After prep and local anesthesia, Burow???s triangles were excised superiorly to the nasal sill and inferiorly around the lip to the labial mucosa.  Two flaps were elevated by undermining the skin laterally in both directions,  the skin and mucosa from the orbicularis muscle.  Any redundant orbicularis oris muscle was removed and complimentary edges of remaining muscle reapposed with a horizontal mattress stitch.  After hemostasis was obtained, the flaps were advanced and closed in a layered fashion.
Ear Wedge Repair Text: Due to exposed cartilage and to restore structure and function of the ear, a wedge excision was completed by carrying down an excision through the full thickness of the ear and cartilage with an inward facing Burow's triangle. The wound was then closed in a layered fashion.
Length To Time In Minutes Device Was In Place: 10
Mustarde Flap Text: Because of full-thickness of the defect and to avoid distortion of the lower eyelid and canthus, a Mustarde cheek rotation flap was planned. After prep and anesthesia, a large Burow???s triangle was excised inferiorly.  An incision was made from the superior portion of the wound over the orbital rim, curving upward onto the temple, then down toward the preauricular crease where another Burow???s triangle was excised.  The full cheek flap was elevated and the wound edges were undermined in the subcutaneous plane. After hemostasis, the flap was rotated into place with care to preserve lower lid position, trimmed at the tip, suspended with a periosteal stitch from the orbital rim, and sutured in a a layered fashion.
Home Suture Removal Text: Patient was provided instructions on removing sutures and will remove their sutures at home.  If they have any questions or difficulties they will call the office.
Skin Substitute Text: Because of the size and depth of the defect and to facilitate healing by granulation, a skin substitute graft was planned.  After prep and local anesthesia, Xenograft material was trimmed to fi the defect and secured circumferentially.
Banner Transposition Flap Text: Because of orientation and full-thickness nature of the defect, a long rhombic transposition flap (banner flap) was planned. After prep and anesthesia, the rhombic flap was carefully incised to straddle relaxed skin tension lines and the anticipated Burow's triangle removed. The flap was raised and the wound edges were all undermined in the subcutaneous plane. After hemostasis, the flap was transposed into the defect and sutured in a layered fashion.
Quadrant Reporting?: no
Oculoplastic Surgeon Procedure Text (A): After obtaining clear surgical margins the patient was sent to oculoplastics for surgical repair.  The patient understands they will receive post-surgical care and follow-up from the referring physician's office.
Stage 2: Number Of Blocks?: 0
Unique Flap 1 Name: Nasal Advancement Flap
Initial Size Of Lesion: 1.6
Dressing: steri-strips and pressure dressing
Burow's Graft Text: Because of the full-thickness nature of the wound and surrounding skin tension, a complex linear repair with Burow's graft was planned. After prep and anesthesia, one or two Burow???s triangles were excised adjacent to the defect and placed into sterile saline.  Two flaps were raised bilaterally by undermining widely in the subcutaneous plane. Hemostasis was obtained and the flaps were advanced into the defect with care to avoid distortion, and the wound edges were closed in a layered fashion, leaving a smaller defect.  A Burow???s triangle was defatted and trimmed to fit this remaining defect, and sutured into place circumferentially.
Trilobed Flap Text: Because of the size and full-thickness nature of the defect, and to maintain form and function of the nose, and to avoid distortion of the nearby nasal tip and ala, a trilobed transposition flap was planned. After prep and local anesthesia, the trilobe was carefully incised with a Burow's triangle oriented superiorly. The flap was raised and the wound edges were undermined in the submuscular plane to the nasofacial junction. After hemostasis, the flaps were transposed into the defect and sutured in a layered fashion
Nodular Melanoma Histology Text: Proliferation of MART-1+ cells.
Scc In Situ Histology Text: There was squamous cell atypia and proliferation in a SCIS pattern.
Mid-Level Procedure Text (C): After obtaining clear surgical margins the patient was sent to a mid-level provider for surgical repair.  The patient understands they will receive post-surgical care and follow-up from the mid-level provider.
Lazy S Complex Repair Preamble Text (Leave Blank If You Do Not Want): Extensive wide undermining was performed.
Number Of Hemigard Strips Per Side: 1
Scc Acantholytic Histology Text: There were aggregates of squamous cells in an acantholytic pattern.
Cheiloplasty (Less Than 50%) Text: In order to maintain form and function of the lip, and to avoid distortion of the free margin, a lip advancement flap was planned. After rep and local anesthesia, Burow???s triangles were excised superiorly to the nasal sill and inferiorly around the lip to the labial mucosa.  Two flaps were elevated by undermining the skin laterally in both directions,  the skin and mucosa from the orbicularis muscle.  Any redundant orbicularis oris muscle was removed and complimentary edges of remaining muscle reapposed with a horizontal mattress stitch.  After hemostasis was obtained, the flaps were advanced and closed in a layered fashion.
Unique Flap 1 Text: Because of the full-thickness nature of the defect, and to maintain form and function of the nose, and the proximity to the nasal tip and ala, a bilateral advancement flap was carefully planned. After anesthesia and prep, a Burow's triangle was excised above the defect up to the nasal root, and a Burow???s triangle displaced centrally and excised below the defect down to the columella.  Two laterally-based flaps were created by undermining at the level of the periosteum and perichondrium skeletonizing the nasal tip, dorsum and sidewalls.  After hemostasis, a tension-reduction stitch was placed at the level of the distal nasal bone, and the flaps were sutured together in a layered fashion.
Consent (Near Eyelid Margin)/Introductory Paragraph: The rationale for Mohs was explained to the patient and consent was obtained. The risks, benefits and alternatives to therapy were discussed in detail. Specifically, the risks of ectropion or eyelid deformity, infection, scarring, bleeding, prolonged wound healing, incomplete removal, allergy to anesthesia, nerve injury and recurrence were addressed. Prior to the procedure, the treatment site was clearly identified and confirmed by the patient. All components of Universal Protocol/PAUSE Rule completed.
Bcc Pigmented Histology Text: There were aggregates of basaloid cells.
Suturegard Body: The suture ends were repeatedly re-tightened and re-clamped to achieve the desired tissue expansion.
Stage 5: Additional Anesthesia Type: 1% lidocaine with 1:100,000 epinephrine and 408mcg clindamycin/ml and a 1:10 solution of 8.4% sodium bicarbonate
Bcc  Morpheaform/Sclerosing Histology Text: There were aggregates of basaloid cells demonstrating a morpheaform/sclerosing pattern.
Retention Suture Bite Size: 3 mm
Scc Pigmented Histology Text: There were aggregates of squamous cells.
Provider Procedure Text (D): After obtaining clear surgical margins the defect was repaired by another provider.
Anesthesia Volume In Cc: 2
Consent (Marginal Mandibular)/Introductory Paragraph: The rationale for Mohs was explained to the patient and consent was obtained. The risks, benefits and alternatives to therapy were discussed in detail. Specifically, the risks of damage to the marginal mandibular branch of the facial nerve, infection, scarring, bleeding, prolonged wound healing, incomplete removal, allergy to anesthesia, and recurrence were addressed. Prior to the procedure, the treatment site was clearly identified and confirmed by the patient. All components of Universal Protocol/PAUSE Rule completed.
Composite Graft Text: In order to decrease risk for distortion of the alar rim, a full-thickness skin graft with cartilage graft was planned. After prep and local anesthesia, a template was made of the defect and the graft was harvested from the conchal bowl. After the graft was harvested, a strip of exposed cartilage was also elevated from the conchal bowl.  The cartilage was either inset into pockets on either side of the defect, or minced and placed at the base of the wound.  The skin graft was then defatted and trimmed to fit the defect. It was sutured into place circumferentially and a tie-over Bolster dressing was applied.  Hemostasis was obtained at the donor site which was then bandaged to heal by second intention.
Modified Advancement Flap Text: Because of the full-thickness nature of the wound and in order to displace lines around important structures, a Burow???s advancement flap was planned. After prep and local anesthesia, a Burow???s triangle was excised adjacent to the defect.  The other Burow's triangle was displaced to hide incisions within skin relaxation lines. Two flaps were created by undermining in the deep subcutaneous plane. After hemostasis, the flaps were advanced and closed in a layered fashion.
Vermilion Border Text: The closure involved the vermilion border.
Paramedian Forehead Flap Text: Because of the size and full-thickness nature of the defect, and to maintain form and function of the nose, a forehead flap with bilateral forehead advancement flap closure of the donor site was planned.  After prep and anesthesia, excisional preparation of the recipient site was performed by outlining the cosmetic unit of the nasal tip.  Hemostasis was obtained.  A template was then made of the defect and transferred with the appropriate length to the upper forehead.  The forehead flap was incised and elevated based on the supratrochlear neurovascular bundle.  This was carefully dissected over the orbital rim to the nasion.  It was distally thinned and transferred to the nasal tip.  This was sutured in a layered fashion.  To close the donor site defect on the forehead, a large Burow???s triangle was excised superiorly and posteriorly into the scalp, and two large flaps were elevated by undermining the entire anterior scalp and forehead to the temples bilaterally, and over the supraorbital rim inferiorly.  After hemostasis was obtained, these flaps were advanced and closed in a layered fashion.
Postop Diagnosis: same
Sox10 - Negative Histology Text: SOX10 staining demonstrates a normal density and pattern of melanocytes along the dermal-epidermal junction. The surgical margins are negative for tumor cells.
Bilobed Transposition Flap Text: Because of the orientation and full-thickness nature of the defect, and in order to avoid distortion of the surrounding structures, a bilobed flap was planned.  After prep and local anesthesia, the flap was then outlined, incised and elevated.  The skin was widely undermined to allow for closure of the donor site defect.  After hemostasis obtained, the flaps were transposed into place and sutured in a layered fashion.
Orbicularis Oris Muscle Flap Text: Due to the deep nature of the defect, location near free margin, and to restore oral sphincter function, an orbicularis oris muscle flap was planned.  Using a sterile surgical marker, an appropriate flap was drawn incorporating the defect. The area thus outlined was incised and advanced into place, re-establishing function and closing the wound, and secured with layered stitches.
Special Stains Stage 10 - Results: Base On Clearance Noted Above
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
Retention Suture Text: Retention sutures were placed to support the closure and prevent dehiscence.
Dressing (No Sutures): pressure dressing with telfa
Nostril Rim Text: The closure involved the nostril rim.
Rotation Flap Text: Because of the full-thickness nature of the defect and proximity to vital structures, a rotation flap was planned. After prep and local anesthesia, the flap was carefully incised along an arc.  A Burow's triangle was removed adjacent to the defect and along the outside of the arc. The flap was raised and the wound edges were undermined in the subcutaneous plane. After hemostasis, the flap was rotated into the defect. The distal tip of the flap was trimmed to fit the defect. The entirety of the flap's arcuate border was sutured in a layered fashion
Additional Anesthesia Volume In Cc: 6
O-T Plasty Text: Because of the full-thickness nature of the defect and location adjacent to important structure(s), a bilateral advancement flap (O to T) was planned. After prep and anesthesia, a Burow's triangle was excised adjacent to the defect.  Incisions were extended from the opposite side of the wound, and smaller Burow???s triangles at the end of each incision.  Two flaps were created by undermining in the subcutaneous plane. After hemostasis was obtained, the flaps were advanced and sutured in a layered fashion.
Non-Graft Cartilage Fenestration Text: The exposed cartilage was fenestrated with a 3mm punch biopsy to help facilitate wound healing, and decrease infection and chondritis.
Keystone Flap Text: Given the location of the defect, the surrounding tension, to facilitate healing, and the shape of the defect, a keystone flap was planned.  Using a sterile surgical marker, an appropriate keystone flap was drawn incorporating the defect, outlining the appropriate donor tissue and placing the expected incisions within the relaxed skin tension lines where possible. The area thus outlined was incised deep to adipose tissue with a #15 scalpel blade.  The skin margins were undermined to an appropriate distance in all directions around the primary defect and laterally outward around the flap utilizing iris scissors.  The wound edges were sutured in a layered fashion.
No Residual Tumor Seen Histology Text: There were no malignant cells seen in the sections examined.
Scc Sarcomatoid Histology Text: There were aggregates of squamous cells in a sarcomatous pattern.
Muscle Hinge Flap Text: Due to the deep nature of the defect, and to ensure survival of the overlying flap or graft repair for cutaneous coverage, the wound was first addressed with a muscle hinge flap.  Three sides of the muscle were incised and flipped over like a page of the book into the defect, and secured with Vicryl stitches.
Desmoplastic Trichoepithelioma Histology Text: There were basaloid cell proliferation in an infiltrative sclerosing pattern.
Consent (Temporal Branch)/Introductory Paragraph: The rationale for Mohs was explained to the patient and consent was obtained. The risks, benefits and alternatives to therapy were discussed in detail. Specifically, the risks of damage to the temporal branch of the facial nerve, infection, scarring, bleeding, prolonged wound healing, incomplete removal, allergy to anesthesia, and recurrence were addressed. Prior to the procedure, the treatment site was clearly identified and confirmed by the patient. All components of Universal Protocol/PAUSE Rule completed.
Donor Site Anesthesia Type: same as repair anesthesia
Tissue Cultured Epidermal Autograft Text: Because of the size and depth of the defect and to facilitate healing by granulation, a tissue-cultured epidermal autograft was planned.  After prep and local anesthesia, Xenograft material was trimmed to fi the defect and secured circumferentially.
Abbe Flap (Upper To Lower Lip) Text: The defect of the lower lip was assessed and measured.  Given the location and size of the defect, an Abbe flap was deemed most appropriate.  Using a sterile surgical marker, an appropriate Abbe flap was measured and drawn on the upper lip. Local anesthesia was then infiltrated.  A scalpel was then used to incise the upper lip through and through the skin, vermilion, muscle and mucosa, leaving the flap pedicled on the opposite side.  The flap was then rotated and transferred to the lower lip defect.  The flap was then sutured into place with a three layer technique, closing the orbicularis oris muscle layer with subcutaneous buried sutures, followed by a mucosal layer and an epidermal layer.
Spiral Flap Text: In order to maintain form and function of the airway, a spiral rotation flap was planned.  After prep and local anesthesia, an incision was made from the inferior wound edge, tangentially parallel to the alar rim, and then extended superiorly across the alar crease, vertically on the nasal sidewall, and laterally toward the cheek, or onto the cheek with a backcut.  The flap was undermined and after hemostasis was obtained, the flap was rotated down into place.  All wound edges were sutured in a layered fashion.
Double O-Z Flap Text: Because of the full-thickness nature of the defect and location adjacent to important structure(s), a bilateral rotation flap (O to T) was planned. After prep and anesthesia, arcuate incisions were extended from two opposite side of the wound.  Two flaps were created by undermining in the subcutaneous plane. After hemostasis was obtained, the flaps were advanced and sutured in a layered fashion.
Interpolation Flap Text: In order to maintain the form and function of the airway, and to maintain the alar groove, a two-stage interpolation axial flap and staged reconstruction was planned for closure.  A telfa template was made of the defect.  This was then used to outline the cheek interpolation flap.  The donor area for the pedicle flap was then anesthetized.  The flap was incised through the skin and subcutaneous tissue down to the layer of the underlying musculature.  The flap was carefully incised within the deep plane to maintain its blood supply. The pedicle was then rotated into position and sutured.  \\n\\nTo close the donor-site defect on the cheek, an advancement flap was created by wide undermining laterally in the subcutaneous plane. After hemostasis was obtained, this flap was advanced medially and sutured in a layered fashion.  The portion of the advancement flap near the pedicle of the interpolation flap was closed with care, so as not to constrict the vasculature of the pedicle.   \\n\\nOnce the flaps were sutured into place, adequate blood supply was confirmed with blanching and refill.  The pedicle was wrapped with xeroform gauze and dressed with telfa and gauze bandage to ensure continued blood supply and protect the attached pedicle.
Manual Repair Warning Statement: We plan on removing the manually selected variable below in favor of our much easier automatic structured text blocks found in the previous tab. We decided to do this to help make the flow better and give you the full power of structured data. Manual selection is never going to be ideal in our platform and I would encourage you to avoid using manual selection from this point on, especially since I will be sunsetting this feature. It is important that you do one of two things with the customized text below. First, you can save all of the text in a word file so you can have it for future reference. Second, transfer the text to the appropriate area in the Library tab. Lastly, if there is a flap or graft type which we do not have you need to let us know right away so I can add it in before the variable is hidden. No need to panic, we plan to give you roughly 6 months to make the change.
Where Do You Want The Question To Include Opioid Counseling Located?: Case Summary Tab
Referred To Asc For Closure Text (Leave Blank If You Do Not Want): After obtaining clear surgical margins the patient was sent to an ASC for surgical repair.  The patient understands they will receive post-surgical care and follow-up from the ASC physician.
V-Y Flap Text: Because of the full-thickness nature of the wound and to preserve nearby structures, a V-toY Advancement flap was planned. After prep and local anesthesia, a Burow???s triangle was excised adjacent to the defect.  Opposite from this, two smaller Burow???s triangles were excised.  Three flaps were created by undermining in the deep subcutaneous plane. After hemostasis, the flaps were advanced and closed in a layered fashion.
Undermining Type: Entire Wound
Follicular Atypia Histology Text: Follicular atypia was noted and reviewed with patient, patient was advised to monitor and report any skin changes.
Mixed Nodular And Micronodular Bcc Histology Text: There were aggregates of basaloid cells in a nodular and micro nodular pattern.
Pan-Cytokeratin - Negative Histology Text: CK staining demonstrates a normal density and pattern.
Z Plasty Text: In order to reduce appearance and discomfort related to the web, a Z-plasty was designed.  Aftert prep and anesthesia, a horizontal incision was made across the web.  A double transposition flap was incised in a Z-plasty fashion.  The wound margins were widely undermined to elevate two flaps of skin.  After hemostasis was obtained, the flaps were transposed into place, and sutured in a a layered fashion.
Nasal Turnover Hinge Flap Text: Due to the deep nature of the defect, and to restore structure and function, and to cover and ensure survival of underlying tissue, and to provide cutaneous coverage, a cutaneous hinge flap was performed.  Three sides of the full-thickness skin adjacent to the defect were incised and flipped over like a page of the book into the defect, and secured with Vicryl stitches.
Purse String (Simple) Text: In order to control the direction of wound closure, to prevent distortion from wound contraction, and to reduce wound size to facilitate wound care and healing, an intermediate repair was planned.  After prep and anesthesia, and circumferential undermining, subcutaneous and dermal stitches were carefully placed across the wound.
Helical Rim Advancement Flap Text: Because of the tightness of the surrounding skin, the full-thickness nature of the defect with exposed cartilage, and to avoid deformity, a helical rim advancement flap was planned.  After prep and anesthesia, any protuberant cartilage or cartilage in the way of tissue movement and approximation was excised.  Then, an incision was made in the anterior portion of the ear through the skin to the posterior ear at the level of the perichondrium both superiorly and inferiorly.  Inferiorly, this extended to the lobule where a Burow???s triangle was excised anteriorly.  The flaps were then  from the ear posteriorly at the level of the perichondrium to the postauricular sulcus.  After hemostasis obtained, the flaps were advanced and closed in a layered fashion
Mohs Method Verbiage: An incision following the standard Mohs approach was done and the specimen was harvested as a microscopic controlled layer.
Cartilage Graft Text: Because of the laxity of the alar rim resulting from loss of fibrofatty support, and to preserve form and function of the nose, a cartilage graft was planned.  An incision was made into the conchal bowl and a cutaneous flap was elevated. The conchal bowl cartilage was excised and after hemostasis was obtained, the cutaneous flap was replaced and sutured down.  The cartilage graft was then carved to fit the defect.  Two pockets were made medially and laterally in the alar rim, and the cartilage strut was sutured into place with Vicryl suture.
Bcc Micronodular Histology Text: There were aggregates of basaloid cells demonstrating a micro nodular pattern.
Mohs Histo Method Verbiage: Each section was then chromacoded and processed in the Mohs lab using the Mohs protocol and submitted for frozen section.
Otolaryngologist Procedure Text (B): After obtaining clear surgical margins the patient was sent to otolaryngology for surgical repair.  The patient understands they will receive post-surgical care and follow-up from the referring physician's office.
Area L Indication Text: Tumors in this location are included in Area L (trunk and extremities).  Mohs surgery is indicated for larger tumors, or tumors with aggressive histologic features, in these anatomic locations.
Closure 4 Information: This tab is for additional flaps and grafts above and beyond our usual structured repairs.  Please note if you enter information here it will not currently bill and you will need to add the billing information manually.
Scc In Situ With Follicular Extension Histology Text: There was squamous cell atypia and proliferation in a SCIS pattern, with follicular or adnexal extension.
Mucosal Advancement Flap Text: Because of the full-thickness nature of the wound and in order to restore and maintain function, a mucosal advancement flap was planned. After prep and local anesthesia, Burow???s triangles were excised adjacent to the defect.  Two flaps were created by undermining in the deep subcutaneous plane over the orbicularis oris. After hemostasis, the flaps were advanced and closed in a layered fashion, with care to maintain vermillion border and oral commissures.
Ftsg Text: Because of the full-thickness nature of the defect, to protect underlying structures, and to avoid distortion, a full-thickness skin graft was planned. After prep and local anesthesia, a template was made of the defect and the graft was harvested.  The graft was defatted and trimmed to fit the defect. It was sutured into place circumferentially and a tie-over Bolster dressing was applied.  The donor site was converted to a fusiform defect, and after hemostasis, was closed in a layered fashion.
Medical Necessity Statement: Based on my medical judgement, Mohs surgery is the most appropriate treatment for this cancer compared to other treatments.
Referred To Oculoplastics For Closure Text (Leave Blank If You Do Not Want): After obtaining clear surgical margins the patient was sent to oculoplastics for surgical repair.  The patient understands they will receive post-surgical care and follow-up from the repairing physician's office.
Wound Care: Vaseline
Repair Type: Complex Repair
Adjacent Tissue Transfer Text: Because of the full-thickness nature of the wound and in order to displace lines around important structures, a Burow's advancement flap was planned. After prep and local anesthesia, a Burow's triangle was excised adjacent to the defect.  The other Burow's triangle was displaced to hide incisions within skin relaxation lines. Two flaps were created by undermining in the deep subcutaneous plane. After hemostasis, the flaps were carried over and closed in a layered fashion.
Abbe Flap (Lower To Upper Lip) Text: The defect of the upper lip was assessed and measured.  Given the location and size of the defect, an Abbe flap was deemed most appropriate.  Using a sterile surgical marker, an appropriate Abbe flap was measured and drawn on the lower lip. Local anesthesia was then infiltrated. A scalpel was then used to incise the upper lip through and through the skin, vermilion, muscle and mucosa, leaving the flap pedicled on the opposite side.  The flap was then rotated and transferred to the lower lip defect.  The flap was then sutured into place with a three layer technique, closing the orbicularis oris muscle layer with subcutaneous buried sutures, followed by a mucosal layer and an epidermal layer.
Information: Selecting Yes will display possible errors in your note based on the variables you have selected. This validation is only offered as a suggestion for you. PLEASE NOTE THAT THE VALIDATION TEXT WILL BE REMOVED WHEN YOU FINALIZE YOUR NOTE. IF YOU WANT TO FAX A PRELIMINARY NOTE YOU WILL NEED TO TOGGLE THIS TO 'NO' IF YOU DO NOT WANT IT IN YOUR FAXED NOTE.
Surgeon Performing Repair (Optional): Dr. Raul Ponce
Mixed Superficial And Nodular Bcc Histology Text: There were aggregates of basaloid cells in a superficial and nodular pattern.
Unique Flap 3 Text: Because of the through-and-through defect of the lateral ala, in order to restore the nose and maintain an open airway, a island-pedicle Spear flap was planned with cheek advancement flap.  After prep and anesthesia, a template was made of the right side of the lip and transferred to the left side to outline the normal anatomic position of the triangular portion of the upper lip.  A template was then made of the lining and outer defect of the left ala and transferred to the medial cheek adjacent to the nasolabial fold.  An island-pedicle flap was incised and elevated and carefully dissected to a muscular subcutaneous pedicle based near the piriform aperture and ascending portion of the maxilla.  This was carefully turned over and sutured to line the ala, then turned up on itself where it was sutured in a layered fashion.  \\n\\nTo close the donor site defect, a cheek flap was elevated by undermining in the subcutaneous plane lateral to the lateral canthus  After hemostasis was obtained the flap was advanced medially, attached to the piriform aperture of the axilla and ascending portion of the maxilla, and then closed in a layered fashion.  The advancement flap measured 3 x 4 cm.
Consent 3/Introductory Paragraph: I gave the patient a chance to ask questions they had about the procedure.  Following this I explained the Mohs procedure and consent was obtained. The risks, benefits and alternatives to therapy were discussed in detail. Specifically, the risks of infection, scarring, bleeding, prolonged wound healing, incomplete removal, allergy to anesthesia, nerve injury and recurrence were addressed. Prior to the procedure, the treatment site was clearly identified and confirmed by the patient. All components of Universal Protocol/PAUSE Rule completed.
Unique Flap 2 Name: Free Cartilage graft
Split-Thickness Skin Graft Text: Because of the size and thickness of the defect, and to provide coverage and facilitate healing with minimal contraction, a split-thickness skin graft was planned.  The donor site was marked and the graft harvested by scalpel, Weck blade, or dermatome.  The graft was then trimmed to fit the defect.  It was sutured into place and a bolster dressing applied.
Scc Well Differentiated Histology Text: There were aggregates of well-differentiated squamous cells.
Scc Ka Subtype Histology Text: There were aggregates of glassy squamous cells consistent with keratoacanthoma.
Stage 12: Additional Anesthesia Type: 1% lidocaine with epinephrine
Unique Flap 3 Name: Spear Flap
Mercedes Flap Text: Because of the full-thickness nature of the defect and tightness of surrounding skin, a triple-advancement flap was planned.  After prep and local anesthesia, three Burow???s triangles were excised adjacent to the defect.  The wound edges were widely undermined to raise three flaps in the deep subcutaneous plane.  Hemostasis was achieved.  The flaps were then advanced into the defect and sutured into place.
Same Histology In Subsequent Stages Text: The pattern and morphology of the tumor is as described in the first stage.
Consent (Nose)/Introductory Paragraph: The rationale for Mohs was explained to the patient and consent was obtained. The risks, benefits and alternatives to therapy were discussed in detail. Specifically, the risks of nasal deformity, changes in the flow of air through the nose, infection, scarring, bleeding, prolonged wound healing, incomplete removal, allergy to anesthesia, nerve injury and recurrence were addressed. Prior to the procedure, the treatment site was clearly identified and confirmed by the patient. All components of Universal Protocol/PAUSE Rule completed.
Double Island Pedicle Flap Text: The defect edges were debeveled with a #15 scalpel blade.  Given the location of the defect, shape of the defect and the proximity to free margins a double island pedicle advancement flap was deemed most appropriate.  Using a sterile surgical marker, an appropriate advancement flap was drawn incorporating the defect, outlining the appropriate donor tissue and placing the expected incisions within the relaxed skin tension lines where possible.    The area thus outlined was incised deep to adipose tissue with a #15 scalpel blade.  The skin margins were undermined to an appropriate distance in all directions around the primary defect and laterally outward around the island pedicle utilizing iris scissors.  There was minimal undermining beneath the pedicle flap. The wound edges were sutured in a layered fashion.
Referred To Otolaryngology For Closure Text (Leave Blank If You Do Not Want): After obtaining clear surgical margins the patient was sent to otolaryngology for surgical repair.  The patient understands they will receive post-surgical care and follow-up from the repairing physician's office.
Anesthesia Type: 1% lidocaine with 1:200,000 epinephrine and a 1:10 solution of 8.4% sodium bicarbonate and 408mcg clindamycin/ml
Consent 2/Introductory Paragraph: Mohs surgery was explained to the patient and consent was obtained. The risks, benefits and alternatives to therapy were discussed in detail. Specifically, the risks of infection, scarring, bleeding, prolonged wound healing, incomplete removal, allergy to anesthesia, nerve injury and recurrence were addressed. Prior to the procedure, the treatment site was clearly identified and confirmed by the patient. All components of Universal Protocol/PAUSE Rule completed.
O-T Advancement Flap Text: Because of the full-thickness nature of the defect and location adjacent to important structure(s), a bilateral advancement flap (A to T) was planned. After prep and anesthesia, a Burow's triangle was excised adjacent to the defect.  Incisions were extended from the opposite side of the wound, and smaller Burow???s triangles at the end of each incision.  Two flaps were created by undermining in the subcutaneous plane. After hemostasis was obtained, the flaps were advanced and sutured in a layered fashion.
Nasalis-Muscle-Based Myocutaneous Island Pedicle Flap Text: In order to avoid distortion of nearby structures, an island pedicle flap was planned.  After prep and local anesthesia, a triangular incision was made.  The flap was dissected to a muscular subcutaneous pedicle.  The skin surrounding the flap was undermined to allow for closure of the donor-site defect. After hemostasis obtained, the flap was advanced into place and sutured in a layered fashion.
Localized Dermabrasion With Wire Brush Text: First, a scalpel was used to shave remove protuberant scar/wound edges and sebaceous hyperplasia.  Next, sterilized sandpaper was used to physically abrade to papillary dermis. This spot dermabrasion was performed to complete skin cancer reconstruction. It also will eliminate the other sun damaged precancerous cells that are known to be part of the regional effect of a lifetime's worth of sun exposure. This localized dermabrasion is therapeutic and should not be considered cosmetic in any regard.
Repair Anesthesia Method: local infiltration
Dermal Autograft Text: The defect edges were debeveled with a #15 scalpel blade.  Given the location of the defect, shape of the defect and the proximity to free margins a dermal autograft was deemed most appropriate.  Using a sterile surgical marker, the primary defect shape was transferred to the donor site. The area thus outlined was incised deep to adipose tissue with a #15 scalpel blade.  The harvested graft was then trimmed of adipose and epidermal tissue until only dermis was left.  The skin graft was then placed in the primary defect and oriented appropriately.
Detail Level: Detailed
Consent (Lip)/Introductory Paragraph: The rationale for Mohs was explained to the patient and consent was obtained. The risks, benefits and alternatives to therapy were discussed in detail. Specifically, the risks of lip deformity, changes in the oral aperture, infection, scarring, bleeding, prolonged wound healing, incomplete removal, allergy to anesthesia, nerve injury and recurrence were addressed. Prior to the procedure, the treatment site was clearly identified and confirmed by the patient. All components of Universal Protocol/PAUSE Rule completed.
Estlander Flap (Upper To Lower Lip) Text: The defect of the lower lip was assessed and measured.  Given the location and size of the defect, an Estlander flap was deemed most appropriate.  Using a sterile surgical marker, an appropriate Estlander flap was measured and drawn on the upper lip. Local anesthesia was then infiltrated. A scalpel was then used to incise the lateral aspect of the flap, through skin, muscle and mucosa, leaving the flap pedicled medially.  The flap was then rotated and positioned to fill the lower lip defect.  The flap was then sutured into place with a three layer technique, closing the orbicularis oris muscle layer with subcutaneous buried sutures, followed by a mucosal layer and an epidermal layer.
Bilateral Helical Rim Advancement Flap Text: Because of the tightness of the surrounding skin, the full-thickness nature of the defect with exposed cartilage, and to avoid deformity, a helical rim advancement flap was planed.  After prep and anesthesia, an incision was made in the anterior portion of the ear through the skin and the cartilage to the posterior perichondrium both superiorly and inferiorly within the scapha of the ear.  Inferiorly, this extended to the lobule where a Burow???s triangle was excised anteriorly.  The chondrocutaneous flaps were then  from the ear posteriorly at the level of the perichondrium to the postauricular sulcus.  A small rim of cartilage was also excised around the contour of the ear both superiorly and inferiorly, and after hemostasis obtained, the chondrocutaneous flaps were advanced and closed in a layered fashion
Island Pedicle Flap With Canthal Suspension Text: In order to avoid distortion of nearby structures, an island pedicle flap was planned.  After prep and local anesthesia, a triangular incision was made.  The flap was dissected to a muscular subcutaneous pedicle.  The skin surrounding the flap was undermined to allow for closure of the donor-site defect. After hemostasis obtained, the flap was advanced into place and sutured in a layered fashion.   suspension suture was placed in the canthal tendon to prevent tension and prevent ectropion.
Scc Spindle Histology Text: There were aggregates of spindle-like squamous cells.
Posterior Auricular Interpolation Flap Text: Due to location on free margin and exposed cartilage and to restore structure and function, a decision was made to reconstruct the defect utilizing an interpolation axial flap and a staged reconstruction.  A telfa template was made of the defect.  This telfa template was then used to outline the posterior auricular interpolation flap.  The donor area for the pedicle flap was then injected with anesthesia.  The flap was excised through the skin and subcutaneous tissue down to the layer of the underlying musculature.  The pedicle flap was carefully excised within this deep plane to maintain its blood supply.  The edges of the donor site were undermined.   The donor site was closed in a primary fashion.  The pedicle was then rotated into position and sutured.  Once the tube was sutured into place, adequate blood supply was confirmed with blanching and refill.  The pedicle was then wrapped with xeroform gauze and dressed appropriately with a telfa and gauze bandage to ensure continued blood supply and protect the attached pedicle.
Scc Moderately Differentiated Histology Text: There were aggregates of moderately-differentiated squamous cells.
Scc Desmoplastic Subtype Histology Text: There were aggregates of squamous cells in a desk-plastic pattern.
H Plasty Text: Given the location of the defect, shape of the defect and the proximity to free margins a H-plasty was deemed most appropriate for repair.  Using a sterile surgical marker, the appropriate advancement arms of the H-plasty were drawn incorporating the defect and placing the expected incisions within the relaxed skin tension lines where possible. The area thus outlined was incised deep to adipose tissue with a #15 scalpel blade. The skin margins were undermined to an appropriate distance in all directions utilizing iris scissors.  The opposing advancement arms were then advanced into place in opposite direction and anchored with interrupted buried subcutaneous sutures.
Repair Hemostasis (Optional): Electrodesiccation
Area M Indication Text: Tumors in this location are included in Area M (cheek, forehead, scalp, neck, jawline and pretibial skin).  Mohs surgery is indicated for tumors in these anatomic locations.
Subsequent Stages Histo Method Verbiage: Using a similar technique to that described above, a thin layer of tissue was removed from all areas where tumor was visible on the previous stage.  The tissue was again oriented, mapped, dyed, and processed as above.
Depth Of Tumor Invasion (For Histology): tumor not visualized
Area Suspicious For Tumor Histology Text: An area suspicious for additional tumor was noted and excised with additional stage(s).
Staging Info: By selecting yes to the question above you will include information on AJCC 8 tumor staging in your Mohs note. Information on tumor staging will be automatically added for SCCs on the head and neck. AJCC 8 includes tumor size, tumor depth, perineural involvement and bone invasion.
Merkel Cell Carcinoma Histology Text: There were aggregates of densely blue cells.
Staged Advancement Flap Text: Because of the full-thickness nature of the wound and in order to displace lines around important structures, a Burow???s advancement flap was planned. After prep and local anesthesia, a Burow???s triangle was excised adjacent to the defect.  The other Burow's triangle was displaced to hide incisions within skin relaxation lines. Two flaps were created by undermining in the deep subcutaneous plane. After hemostasis, the flaps were advanced and closed in a layered fashion. This is a staged repair and the patient will return for additional surgery.
Helical Rim Text: The closure involved the helical rim.
Dfsp Histology Text: There were densely arranged spindle-shaped cells.
Plastic Surgeon Procedure Text (D): After obtaining clear surgical margins the patient was sent to plastics for surgical repair.  The patient understands they will receive post-surgical care and follow-up from the referring physician's office.
Mauc Instructions: By selecting yes to the question below the MAUC number will be added into the note.  This will be calculated automatically based on the diagnosis chosen, the size entered, the body zone selected (H,M,L) and the specific indications you chose. You will also have the option to override the Mohs AUC if you disagree with the automatically calculated number and this option is found in the Case Summary tab.
Bcc Superficial Pigmented Histology Text: There were aggregates of basaloid cells budding from the epidermis.
Primary Defect Width In Cm (Final Defect Size - Required For Flaps/Grafts): 2.1
W Plasty Text: The lesion was extirpated to the level of the fat with a #15 scalpel blade.  Given the location of the defect, shape of the defect and the proximity to free margins a W-plasty was deemed most appropriate for repair.  Using a sterile surgical marker, the appropriate transposition arms of the W-plasty were drawn incorporating the defect and placing the expected incisions within the relaxed skin tension lines where possible.    The area thus outlined was incised deep to adipose tissue with a #15 scalpel blade.  The skin margins were undermined to an appropriate distance in all directions utilizing iris scissors.  The opposing transposition arms were then transposed into place in opposite direction and anchored with interrupted buried subcutaneous sutures.
Area H Indication Text: Tumors in this location are included in Area H (eyelids, eyebrows, nose, lips, chin, ear, pre-auricular, post-auricular, temple, genitalia, hands, feet, ankles and areola).  Tissue conservation is critical in these anatomic locations.
Unique Flap 2 Text: Because of the full-thickness nature of the defect and to reduce risk for alar rim retraction, and after discussion of options and risk for alar retraction with this repair, a free cartilage graft was planned.  An incision through the anterior antihelix was made and the skin lifted in the periochondrial plane.  A square of cartilage sized to fit defect.
Secondary Intention Text (Leave Blank If You Do Not Want): Due to the superficial nature of the defect and/or patient preference, the wound was allowed to heal by secondary intention.
Crescentic Advancement Flap Text: In order to maintain form and function of the airway, a crescentic advancement flap was planned.  After prep and local anesthesia, a Burow's triangle was excised superior to the defect.  A tangential and curvilinear incision was made onto the medial cheek.  Here, a crescentic Burow's triangle was excised.  The laterally-based flap was then raised by dissection in the deep subcutaneous plane and the remainder of the wound edges were also undermined.  After hemostasis, the flap was carried over into the defect with a periosteal suture at the base of the flap and the lateral nasal bone.  All wound edges were closed in a layered fashion.
Metatypical Bcc Histology Text: There were aggregates of basaloid cells in a metatypical pattern.
Alar Island Pedicle Flap Text: The defect edges were debeveled with a #15 scalpel blade.  Given the location of the defect, shape of the defect and the proximity to the alar rim an island pedicle advancement flap was deemed most appropriate.  Using a sterile surgical marker, an appropriate advancement flap was drawn incorporating the defect, outlining the appropriate donor tissue and placing the expected incisions within the nasal ala running parallel to the alar rim. The area thus outlined was incised with a #15 scalpel blade.  The skin margins were undermined minimally to an appropriate distance in all directions around the primary defect and laterally outward around the island pedicle utilizing iris scissors.  There was minimal undermining beneath the pedicle flap.
Incidental Superficial Basal Cell Carcinoma Histology Text: Incidental superficial BCC was noted at the periphery of the Mohs section and additional stages were performed until clear.
Bilobed Flap Text: Because of the size and full-thickness nature of the defect, and to maintain form and function of the nose, and to avoid distortion of the nearby nasal tip and ala, a bilobed transposition flap was planned. After prep and local anesthesia, the bilobe was carefully incised with a Burow's triangle oriented superiorly. The flap was raised and the wound edges were undermined in the submuscular plane to the nasofacial junction. After hemostasis, the flaps were transposed into the defect and sutured in a layered fashion
Intermediate Repair And Graft Additional Text (Will Appearing After The Standard Complex Repair Text): The intermediate repair was not sufficient to completely close the primary defect. The remaining additional defect was repaired with the graft mentioned below.
Is There Documentation In The Chart Showing Discussion Of Changes With Another Physician?: Please Select the Appropriate Response
No Repair - Repaired With Adjacent Surgical Defect Text (Leave Blank If You Do Not Want): After obtaining clear surgical margins the defect was repaired concurrently with another surgical defect which was in close approximation.
Consent (Ear)/Introductory Paragraph: The rationale for Mohs was explained to the patient and consent was obtained. The risks, benefits and alternatives to therapy were discussed in detail. Specifically, the risks of ear deformity, infection, scarring, bleeding, prolonged wound healing, incomplete removal, allergy to anesthesia, nerve injury and recurrence were addressed. Prior to the procedure, the treatment site was clearly identified and confirmed by the patient. All components of Universal Protocol/PAUSE Rule completed.
Intermediate Repair Preamble Text (Leave Blank If You Do Not Want): Undermining was performed with sharp dissection.
Peng Advancement Flap Text: Because of the full-thickness nature of the defect and location adjacent to free margin of alar rims, and to maintain functional airway and alar rim position, a bilateral advancement flap was planned. After prep and anesthesia, incisions were extended from the opposite side of the wound along the alar grooves, and Burow???s triangles at the end of each incision were excised.  Two flaps were created by undermining in the subcutaneous plane skeletonizing the nasal cartilages. After hemostasis was obtained, the flaps were advanced and sutured in a layered fashion.
Pain Refusal Text: I offered to prescribe pain medication but the patient refused to take this medication.
Chonodrocutaneous Helical Advancement Flap Text: Because of the tightness of the surrounding skin, the full-thickness nature of the defect with exposed cartilage, and to avoid deformity, a helical rim advancement flap was planed.  After prep and anesthesia, an incision was made in the anterior portion of the ear through the skin and the cartilage to the posterior perichondrium both superiorly and inferiorly within the scapha of the ear.  Inferiorly, this extended to the lobule where a Burow???s triangle was excised anteriorly.  The chondrocutaneous flaps were then  from the ear posteriorly at the level of the perichondrium to the postauricular sulcus.  A small rim of cartilage was also excised around the contour of the ear and after hemostasis obtained, the chondrocutaneous flaps were advanced and closed in a layered fashion
Consent Type: Consent 1 (Standard)
Wound Care (No Sutures): Petrolatum
Consent (Scalp)/Introductory Paragraph: The rationale for Mohs was explained to the patient and consent was obtained. The risks, benefits and alternatives to therapy were discussed in detail. Specifically, the risks of changes in hair growth pattern secondary to repair, infection, scarring, bleeding, prolonged wound healing, incomplete removal, allergy to anesthesia, nerve injury and recurrence were addressed. Prior to the procedure, the treatment site was clearly identified and confirmed by the patient. All components of Universal Protocol/PAUSE Rule completed.
Tarsorrhaphy Text: A tarsorrhaphy was performed using Frost sutures.
Missing Epidermis Histology Text: Missing tissue was noted on frozen sections and additional slides were cut until present.  If no additional tissue containing epidermis was available, then an additional stage was excised.
Mart-1 - Positive Histology Text: MART-1 staining demonstrates areas of higher density and clustering of melanocytes with Pagetoid spread upwards within the epidermis. The surgical margins are positive for tumor cells.
Hemigard Intro: Due to skin fragility and wound tension, it was decided to use HEMIGARD adhesive retention suture devices to permit a linear closure. The skin was cleaned and dried for a 6cm distance away from the wound. Excessive hair, if present, was removed to allow for adhesion.
Consent (Spinal Accessory)/Introductory Paragraph: The rationale for Mohs was explained to the patient and consent was obtained. The risks, benefits and alternatives to therapy were discussed in detail. Specifically, the risks of damage to the spinal accessory nerve, infection, scarring, bleeding, prolonged wound healing, incomplete removal, allergy to anesthesia, and recurrence were addressed. Prior to the procedure, the treatment site was clearly identified and confirmed by the patient. All components of Universal Protocol/PAUSE Rule completed.
Primary Defect Length In Cm (Final Defect Size - Required For Flaps/Grafts): 2.4
Mixed Nodular And Infiltrative Bcc Histology Text: There were aggregates of basaloid cells in a nodular and infiltrative pattern.
Lazy S Intermediate Repair Preamble Text (Leave Blank If You Do Not Want): Undermining was performed with blunt dissection.
Bcc Keratotic Histology Text: There were aggregates of basaloid cells demonstrating a pink keratotic pattern.
Intermediate Repair And Flap Additional Text (Will Appearing After The Standard Complex Repair Text): The intermediate repair was not sufficient to completely close the primary defect. The remaining additional defect was repaired with the flap mentioned below.
Tumor Debulked?: scalpel
Transposition Flap Text: Because of orientation and full-thickness nature of the defect, a rhombic transposition flap was planned. After prep and anesthesia, the rhombic flap was carefully incised to straddle relaxed skin tension lines and the anticipated Burow's triangle removed. The flap was raised and the wound edges were all undermined in the subcutaneous plane. After hemostasis, the flap was transposed into the defect and sutured in a layered fashion.
Xenograft Text: Because of the size and depth of the defect and to facilitate healing by granulation, a tissue-cultured epidermal autograft was planned.  After prep and local anesthesia, Xenograft material was trimmed to fi the defect and secured
Consent 1/Introductory Paragraph: The rationale for Mohs was explained to the patient and consent was obtained. The risks, benefits and alternatives to therapy were discussed in detail. Specifically, the risks of infection, scarring, bleeding, prolonged wound healing, incomplete removal, allergy to anesthesia, nerve injury and recurrence were addressed. Prior to the procedure, the treatment site was clearly identified and confirmed by the patient. All components of Universal Protocol/PAUSE Rule completed.
Mohs Case Number: [id number]
Estimated Blood Loss (Cc): minimal
Mohs Rapid Report Verbiage: The area of clinically evident tumor was marked with skin marking ink and appropriately hatched.  The initial incision was made following the Mohs approach through the skin.  The specimen was taken to the lab, divided into the necessary number of pieces, chromacoded and processed according to the Mohs protocol.  This was repeated in successive stages until a tumor free defect was achieved.
Location Indication Override (Is Already Calculated Based On Selected Body Location): Area L
Epidermal Autograft Text: Because of the location and depth of the defect and to facilitate healing, an epidermal autograft was deemed most appropriate.  The epidermal-dermal pinch grafts were then harvested.  The skin grafts were then placed in the primary defect and oriented appropriately. The grafts were secured with vaseline gauze and bandage.
Rhombic Flap Text: Because of the size and full-thickness nature of the defect, and in order to maintain form and function while avoiding distortion of the nearby nasal tip and ala, a rhombic transposition flap was planned. After prep and anesthesia, a Burow's triangle was excised laterally, just superior to the alar groove. The rhombic flap was carefully incised superior to the defect.  The flap was raised and the wound edges were all undermined in the subcutaneous plane. After hemostasis, the flap was transposed into the defect and sutured in a layered fashion.
Zygomaticofacial Flap Text: Because of full-thickness of the defect and to avoid distortion of the lower eyelid, a full cheek rotation flap was planned. After prep and anesthesia, a large Burow???s triangle was excised inferiorly.  An incision was made from the superior portion of the wound over the orbital rim, curving upward onto the temple, then downward along the preauricular crease and below the ear lobule where another Burow???s triangle was excised.  The full cheek flap was elevated and the wound edges were undermined in the subcutaneous plane. After hemostasis, the flap was rotated into place, trimmed at the tip, suspended with a periosteal stitch from the orbital rim, and sutured in a a layered fashion.
Presence Of Scar Tissue (For Histology): absent
Mastoid Interpolation Flap Text: Due to the full-thickness nature of the wound and to restore structure/function, a decision was made to reconstruct the defect utilizing an interpolation axial flap and a staged reconstruction.  A telfa template was made of the defect.  This telfa template was then used to outline the mastoid interpolation flap.  The donor area for the pedicle flap was then injected with anesthesia.  The flap was excised through the skin and subcutaneous tissue down to the layer of the underlying musculature.  The pedicle flap was carefully excised within this deep plane to maintain its blood supply.  The edges of the donor site were undermined.   The donor site was closed in a primary fashion.  The pedicle was then rotated into position and sutured.  Once the tube was sutured into place, adequate blood supply was confirmed with blanching and refill.  The pedicle was then wrapped with xeroform gauze and dressed appropriately with a telfa and gauze bandage to ensure continued blood supply and protect the attached pedicle.
Complex Repair And Graft Additional Text (Will Appearing After The Standard Complex Repair Text): The complex repair was not sufficient to completely close the primary defect. The remaining additional defect was repaired with the graft mentioned below.
O-L Flap Text: Because of the full-thickness nature of the defect, and to preserve nearby structures and landmarks, a Burow???s advancement flap (L-plasty) was planned. After prep and anesthesia, a Burow's triangle was excised adjacent to the defect.  Perpendicular to this, a line was incised and crescentic Burow???s triangle excised.  The flap was elevated by undermining in the subcutaneous plane. Hemostasis was obtained.  The flap was advanced into the defect with care to avoid distortion and was sutured into place in a layered fashion
Eye Protection Verbiage: Before proceeding with the stage, a plastic scleral shield was inserted. The globe was anesthetized with a few drops of 1% lidocaine with 1:100,000 epinephrine. Then, an appropriate sized scleral shield was chosen and coated with lacrilube ointment. The shield was gently inserted and left in place for the duration of each stage. After the stage was completed, the shield was gently removed.
Double O-Z Plasty Text: Because of the full-thickness nature of the defect and location adjacent to important structure(s), a bilateral rotation flap (double O to Z) was planned. After prep and anesthesia, arcuate incisions were extended from two opposite side of the wound.  Two flaps were created by undermining in the subcutaneous plane. After hemostasis was obtained, the flaps were advanced and sutured in a layered fashion.
Deep Sutures: 3-0 Vicryl
Closure 2 Information: This tab is for additional flaps and grafts, including complex repair and grafts and complex repair and flaps. You can also specify a different location for the additional defect, if the location is the same you do not need to select a new one. We will insert the automated text for the repair you select below just as we do for solitary flaps and grafts. Please note that at this time if you select a location with a different insurance zone you will need to override the ICD10 and CPT if appropriate.
Simple / Intermediate / Complex Repair - Final Wound Length In Cm: 3.8
Hatchet Flap Text: Because of the full-thickness nature of the defect and to avoid deformity of free margin of the ala, and after full discussion of the spectrum of repair options, a dorsal nasal rotation flap was planned.  After prep and anesthesia, a Burow???s triangle was excised from the lateral portion superiorly on the nasal sidewall towards the inner canthus and an incision extended from the inferior margin of the wound across the nose and sidewall, then extended superiorly along the nasofacial sulcus and medial cheek through the inner canthus to the glabella where a back cut was made to the contralateral inner canthus.  The flap was elevated by skeletonizing the nasal root, dorsum, and sidewalls.  After hemostasis obtained, the flap was rotated into place, trimmed to fit the defect, and sutured in a layered fashion.
Bcc Adenoid Histology Text: There were aggregates of basaloid cells demonstrating an adenoid or cribiform pattern.
Bi-Rhombic Flap Text: Because of orientation and full-thickness nature of the defect, a rhombic transposition flap was planned. After  prep and anesthesia, two rhombic flaps were carefully incised to straddle relaxed skin tension lines and the anticipated Burow's triangle were removed. The flap was raised and the wound edges were all undermined in the subcutaneous plane. After hemostasis, the flaps were transposed into the defect and sutured in a layered fashion.
Surgeon/Pathologist Verbiage (Will Incorporate Name Of Surgeon From Intro If Not Blank): operated in two distinct and integrated capacities as the surgeon and pathologist.
Referred To Plastics For Closure Text (Leave Blank If You Do Not Want): After obtaining clear surgical margins the patient was sent to plastics for surgical repair.  The patient understands they will receive post-surgical care and follow-up from the repairing physician's office.
Pinch Graft Text: The defect edges were debeveled with a #15 scalpel blade. Given the location of the defect, shape of the defect and the proximity to free margins a pinch graft was deemed most appropriate. Using a sterile surgical marker, the primary defect shape was transferred to the donor site. The area thus outlined was incised deep to adipose tissue with a #15 scalpel blade.  The harvested graft was then trimmed of adipose tissue until only dermis and epidermis was left. The skin margins of the secondary defect were undermined to an appropriate distance in all directions utilizing iris scissors.  The secondary defect was closed with interrupted buried subcutaneous sutures.  The skin edges were then re-apposed with running  sutures.  The skin graft was then placed in the primary defect and oriented appropriately.
Inflammation Suggestive Of Cancer Camouflage Histology Text: There was a dense lymphocytic infiltrate which prevented adequate histologic evaluation of adjacent structures.
Fellow (Optional): Dr. Chay Morales
Double Z Plasty Text: The lesion was extirpated to the level of the fat with a #15 scalpel blade. Given the location of the defect, shape of the defect and the proximity to free margins a double Z-plasty was deemed most appropriate for repair. Using a sterile surgical marker, the appropriate transposition arms of the double Z-plasty were drawn incorporating the defect and placing the expected incisions within the relaxed skin tension lines where possible. The area thus outlined was incised deep to adipose tissue with a #15 scalpel blade. The skin margins were undermined to an appropriate distance in all directions utilizing iris scissors. The opposing transposition arms were then transposed and carried over into place in opposite direction and anchored with interrupted buried subcutaneous sutures.
Post-Care Instructions: I reviewed with the patient in detail post-care instructions. Patient is not to engage in any heavy lifting, exercise, or swimming for the next 14 days. Should the patient develop any fevers, chills, bleeding, severe pain patient will contact the office immediately.
Star Wedge Flap Text: Because of the tightness of the surrounding skin, the full-thickness nature of the defect with exposed cartilage, and to avoid deformity, a star wedge advancement flap was planned.  After prep and anesthesia, a full-thickness triangular wedge of tissue was excised, as well as two Burow's triangles on either side of this to reduce cupping deformity. The chondrocutaneous flaps were then advanced and closed in a layered fashion.
Hemigard Postcare Instructions: The HEMIGARD strips are to remain completely dry for at least 5-7 days.
Advancement-Rotation Flap Text: In order to maintain form and function of the airway, a spiral rotation flap was planned.  After prep and local anesthesia, an incision was made from the inferior, tangentially parallel to the alar rim, and then extended superiorly across the alar crease, vertically on the nasal sidewall, and laterally toward the cheek, or onto the cheek with a backcut.  The flap was undermined and after hemostasis was obtained, the flap was rotated down into place.  All wound edges were sutured in a layered fashion.
Epidermal Sutures: 5-0 Fast Absorbing Gut
Complex Repair And Flap Additional Text (Will Appearing After The Standard Complex Repair Text): The complex repair was not sufficient to completely close the primary defect. The remaining additional defect was repaired with the flap mentioned below.
Debridement Text: The wound edges were debrided prior to proceeding with the closure to facilitate wound healing.
Incidental Actinic Keratosis Histology Text: Incidental AK was noted at the periphery of the Mohs section destruction was performed, pt was was advised to monitor, and/or patient was advised to considered topical 5FU once fully healed.
Bcc Infundibulocystic Histology Text: There were aggregates of basaloid cells demonstrating an infundibulocystic pattern.
Mart-1 - Negative Histology Text: MART-1 staining demonstrates a normal density and pattern of melanocytes along the dermal-epidermal junction. The surgical margins are negative for tumor cells.
Scc Poorly Differentiated Histology Text: There were aggregates of poorly-differentiated squamous cells.
Melolabial Transposition Flap Text: In order to maintain form and function of the airway, and to avoid distortion, a nasolabial transposition flap with cheek advancement flap closure at the donor site was planned. After prep and local anesthesia, a Burow's triangle was excised superiorly toward the inner canthus.  An incision was made inferiorly in the nasolabial fold to the lateral commissure of the mouth, then extended superiorly on the medial cheek where a nasolabial flap was elevated by undermining the skin in the subcutaneous plane of the cheek.  The primary defect on the nose was also undermined. The flap was distally thinned, transposed into place, amputated at the tip to fit the defect, and sutured to the nose defect in a layered fashion.  See above for size of this flap.  \\n\\nTo close the donor-site defect on the cheek, a cheek advancement flap was elevated by undermining the skin of the cheek in the subcutaneous plane laterally beyond the lateral canthus and superiorly above the orbital rim. After hemostasis was obtained, the flap was advanced medially and attached with peristeal stitches to the pyriform aperture of the maxilla and to the ascending portion of the maxilla. Remaining wound edges were closed in a layered fashion.  This cheek advancement flap measured 3 x 4 cm.
Bilateral Rotation Flap Text: The defect edges were debeveled with a #15 scalpel blade. Given the location of the defect, shape of the defect and the proximity to free margins a bilateral rotation flap was deemed most appropriate. Using a sterile surgical marker, an appropriate rotation flap was drawn incorporating the defect and placing the expected incisions within the relaxed skin tension lines where possible. The area thus outlined was incised deep to adipose tissue with a #15 scalpel blade. The skin margins were undermined to an appropriate distance in all directions utilizing iris scissors. Following this, the designed flap was carried over into the primary defect and sutured into place.
Ear Star Wedge Flap Text: Because of the tightness of the surrounding skin, the full-thickness nature of the defect with exposed cartilage, and to avoid deformity, a star wedge advancement flap was planed.  After prep and anesthesia, a full-thickness triangular wedge of tissue was excised, as well as two brows triangles on either side of this to reduce cupping deformity. The chondrocutaneous flaps were then advanced and closed in a layered fashion.
Tumor Depth: Less than 6mm from granular layer and no invasion beyond the subcutaneous fat
Suturegard Intro: Intraoperative tissue expansion was performed, utilizing the SUTUREGARD device, in order to reduce wound tension.
Bcc Infiltrative Histology Text: There were aggregates of basaloid cells demonstrating an infiltrative pattern.
Alternatives Discussed Intro (Do Not Add Period): I discussed alternative treatments to Mohs surgery and specifically discussed the risks and benefits of
Localized Dermabrasion With 15 Blade Text: The patient was draped in routine manner.  Localized dermabrasion using a 15 blade was performed in routine manner to papillary dermis. This spot dermabrasion is being performed to complete skin cancer reconstruction. It also will eliminate the other sun damaged precancerous cells that are known to be part of the regional effect of a lifetime's worth of sun exposure. This localized dermabrasion is therapeutic and should not be considered cosmetic in any regard.
Eyelid Full Thickness Repair - 21340: The eyelid defect was full thickness which required a wedge repair of the eyelid. Special care was taken to ensure that the eyelid margin was realligned when placing sutures.
Eyelid Partial Thickness Repair - 65453: The eyelid defect was partial thickness which required a wedge repair of the eyelid. Special care was taken to ensure that the eyelid margin was realligned when placing sutures.
Which Eyelid Repair Cpt Are You Using?: 03137
Rectangular Flap Text: The defect edges were debeveled with a #15 scalpel blade. Given the location of the defect and the proximity to free margins a rectangular flap was deemed most appropriate. Using a sterile surgical marker, an appropriate rectangular flap was drawn incorporating the defect. The area thus outlined was incised deep to adipose tissue with a #15 scalpel blade. The skin margins were undermined to an appropriate distance in all directions utilizing iris scissors. Following this, the designed flap was carried over into the primary defect and sutured into place.
Bill 59 Modifier?: No - Continue to Bill 79 Modifier
Nasolabial Transposition Flap Text: The defect edges were debeveled with a #15 scalpel blade.  Given the size, depth and location of the defect and the proximity to free margins a nasolabial transposition flap was deemed most appropriate. Using a sterile surgical marker, an appropriate flap was drawn incorporating the defect. The area thus outlined was incised with a #15 scalpel blade. The skin margins were undermined to an appropriate distance in all directions utilizing iris scissors. Following this, the designed flap was carried into the primary defect and sutured into place.
Which Instrument Did You Use For Dermabrasion?: Wire Brush
Nasalis Myocutaneous Flap Text: Using a #15 blade, an incision was made around the donor flap to the level of the nasalis muscle. Wide lateral undermining was then performed in both the subcutaneous plane above the nasalis muscle, and in a submuscular plane just above periosteum. This allowed the formation of a free nasalis muscle axial pedicle which was still attached to the actual cutaneous flap, increasing its mobility and vascular viability. Hemostasis was obtained with pinpoint electrocoagulation. The flap was mobilized into position and the pivotal anchor points positioned and stabilized with buried interrupted sutures. Subcutaneous and dermal tissues were closed in a multilayered fashion with sutures. Tissue redundancies were excised, and the epidermal edges were apposed without significant tension and sutured with sutures.
Flip-Flop Flap Text: The defect edges were debeveled with a #15 blade scalpel.  Given the location of the defect and the proximity to free margins a flip-flop flap was deemed most appropriate. Using a sterile surgical marker, the appropriate flap was drawn incorporating the defect and placing the expected incisions between the helical rim and antihelix where possible.  The area thus outlined was incised through and through with a #15 scalpel blade. Following this, the designed flap was carried over into the primary defect and sutured into place.
Localized Dermabrasion With Sand Papertext: The patient was draped in routine manner.  Localized dermabrasion using sterile sand paper was performed in routine manner to papillary dermis. This spot dermabrasion is being performed to complete skin cancer reconstruction. It also will eliminate the other sun damaged precancerous cells that are known to be part of the regional effect of a lifetime's worth of sun exposure. This localized dermabrasion is therapeutic and should not be considered cosmetic in any regard.

## 2024-11-06 NOTE — PROCEDURE: MOHS SURGERY
Medication refill information reviewed.     Due date for oxyCODONE (ROXICODONE) 5 MG tablet  is 11/18/24     Prescriptions prepped for review.     Will route to provider.          Stage 1 Add-On Histology Text: 3mm margin taken during first layer

## 2025-09-03 ENCOUNTER — APPOINTMENT (OUTPATIENT)
Dept: URBAN - METROPOLITAN AREA CLINIC 195 | Age: 85
Setting detail: DERMATOLOGY
End: 2025-09-03

## 2025-09-03 DIAGNOSIS — Z85.828 PERSONAL HISTORY OF OTHER MALIGNANT NEOPLASM OF SKIN: ICD-10-CM

## 2025-09-03 DIAGNOSIS — L82.1 OTHER SEBORRHEIC KERATOSIS: ICD-10-CM

## 2025-09-03 DIAGNOSIS — Z12.83 ENCOUNTER FOR SCREENING FOR MALIGNANT NEOPLASM OF SKIN: ICD-10-CM

## 2025-09-03 DIAGNOSIS — L57.0 ACTINIC KERATOSIS: ICD-10-CM

## 2025-09-03 DIAGNOSIS — Z85.820 PERSONAL HISTORY OF MALIGNANT MELANOMA OF SKIN: ICD-10-CM

## 2025-09-03 DIAGNOSIS — D485 NEOPLASM OF UNCERTAIN BEHAVIOR OF SKIN: ICD-10-CM

## 2025-09-03 DIAGNOSIS — L21.8 OTHER SEBORRHEIC DERMATITIS: ICD-10-CM

## 2025-09-03 DIAGNOSIS — L57.8 OTHER SKIN CHANGES DUE TO CHRONIC EXPOSURE TO NONIONIZING RADIATION: ICD-10-CM

## 2025-09-03 DIAGNOSIS — Z86.006 PERSONAL HISTORY OF MELANOMA IN-SITU: ICD-10-CM

## 2025-09-03 PROCEDURE — OTHER LIQUID NITROGEN: OTHER

## 2025-09-03 PROCEDURE — OTHER COUNSELING: OTHER

## 2025-09-03 PROCEDURE — OTHER MIPS QUALITY: OTHER

## 2025-09-03 PROCEDURE — OTHER BIOPSY BY SHAVE METHOD WITH FROZEN SECTION: OTHER

## 2025-09-03 PROCEDURE — OTHER DEFER: OTHER

## 2025-09-03 PROCEDURE — OTHER VISIT COMPLEXITY: OTHER

## 2025-09-03 PROCEDURE — OTHER ASC CONSULTATION: OTHER

## 2025-09-03 PROCEDURE — OTHER BIOPSY BY SHAVE METHOD: OTHER

## 2025-09-03 PROCEDURE — OTHER SURGICAL DECISION MAKING: OTHER

## 2025-09-03 ASSESSMENT — LOCATION DETAILED DESCRIPTION DERM
LOCATION DETAILED: LEFT SUPERIOR LATERAL UPPER BACK
LOCATION DETAILED: LEFT INFERIOR PARIETAL SCALP
LOCATION DETAILED: RIGHT ANTERIOR SHOULDER
LOCATION DETAILED: LEFT SUPERIOR UPPER BACK
LOCATION DETAILED: RIGHT INFERIOR MEDIAL MIDBACK
LOCATION DETAILED: LEFT SUPERIOR PARIETAL SCALP
LOCATION DETAILED: SUPERIOR LUMBAR SPINE
LOCATION DETAILED: LEFT SUPERIOR OCCIPITAL SCALP
LOCATION DETAILED: RIGHT CHIN
LOCATION DETAILED: LEFT INFERIOR UPPER BACK
LOCATION DETAILED: NASAL TIP
LOCATION DETAILED: RIGHT SUPERIOR CENTRAL MALAR CHEEK
LOCATION DETAILED: RIGHT LATERAL UPPER BACK

## 2025-09-03 ASSESSMENT — LOCATION SIMPLE DESCRIPTION DERM
LOCATION SIMPLE: RIGHT UPPER BACK
LOCATION SIMPLE: RIGHT LOWER BACK
LOCATION SIMPLE: LEFT UPPER BACK
LOCATION SIMPLE: NOSE
LOCATION SIMPLE: LEFT OCCIPITAL SCALP
LOCATION SIMPLE: LOWER BACK
LOCATION SIMPLE: CHIN
LOCATION SIMPLE: RIGHT SHOULDER
LOCATION SIMPLE: RIGHT CHEEK
LOCATION SIMPLE: SCALP

## 2025-09-03 ASSESSMENT — LOCATION ZONE DERM
LOCATION ZONE: ARM
LOCATION ZONE: SCALP
LOCATION ZONE: TRUNK
LOCATION ZONE: NOSE
LOCATION ZONE: FACE